# Patient Record
Sex: MALE | Race: WHITE | Employment: FULL TIME | URBAN - METROPOLITAN AREA
[De-identification: names, ages, dates, MRNs, and addresses within clinical notes are randomized per-mention and may not be internally consistent; named-entity substitution may affect disease eponyms.]

---

## 2019-03-07 ENCOUNTER — OFFICE VISIT (OUTPATIENT)
Dept: FAMILY MEDICINE CLINIC | Age: 54
End: 2019-03-07
Payer: COMMERCIAL

## 2019-03-07 VITALS
HEIGHT: 71 IN | OXYGEN SATURATION: 97 % | HEART RATE: 52 BPM | TEMPERATURE: 96.1 F | BODY MASS INDEX: 27.19 KG/M2 | DIASTOLIC BLOOD PRESSURE: 84 MMHG | SYSTOLIC BLOOD PRESSURE: 130 MMHG | WEIGHT: 194.2 LBS

## 2019-03-07 DIAGNOSIS — F43.22 ADJUSTMENT DISORDER WITH ANXIOUS MOOD: ICD-10-CM

## 2019-03-07 DIAGNOSIS — R07.89 OTHER CHEST PAIN: Primary | ICD-10-CM

## 2019-03-07 PROCEDURE — 99203 OFFICE O/P NEW LOW 30 MIN: CPT | Performed by: FAMILY MEDICINE

## 2019-03-07 PROCEDURE — 93000 ELECTROCARDIOGRAM COMPLETE: CPT | Performed by: FAMILY MEDICINE

## 2019-03-07 RX ORDER — LISINOPRIL 5 MG/1
5 TABLET ORAL DAILY
COMMUNITY
End: 2019-11-05 | Stop reason: SDUPTHER

## 2019-03-07 RX ORDER — ESCITALOPRAM OXALATE 10 MG/1
10 TABLET ORAL DAILY
Qty: 30 TABLET | Refills: 2 | Status: SHIPPED | OUTPATIENT
Start: 2019-03-07 | End: 2019-10-21

## 2019-03-07 SDOH — HEALTH STABILITY: MENTAL HEALTH: HOW OFTEN DO YOU HAVE A DRINK CONTAINING ALCOHOL?: 2-3 TIMES A WEEK

## 2019-03-07 SDOH — ECONOMIC STABILITY: FOOD INSECURITY: WITHIN THE PAST 12 MONTHS, THE FOOD YOU BOUGHT JUST DIDN'T LAST AND YOU DIDN'T HAVE MONEY TO GET MORE.: NEVER TRUE

## 2019-03-07 SDOH — HEALTH STABILITY: MENTAL HEALTH: HOW MANY STANDARD DRINKS CONTAINING ALCOHOL DO YOU HAVE ON A TYPICAL DAY?: 1 OR 2

## 2019-03-07 SDOH — ECONOMIC STABILITY: TRANSPORTATION INSECURITY
IN THE PAST 12 MONTHS, HAS LACK OF TRANSPORTATION KEPT YOU FROM MEETINGS, WORK, OR FROM GETTING THINGS NEEDED FOR DAILY LIVING?: NO

## 2019-03-07 SDOH — ECONOMIC STABILITY: TRANSPORTATION INSECURITY
IN THE PAST 12 MONTHS, HAS THE LACK OF TRANSPORTATION KEPT YOU FROM MEDICAL APPOINTMENTS OR FROM GETTING MEDICATIONS?: NO

## 2019-03-07 SDOH — ECONOMIC STABILITY: INCOME INSECURITY: HOW HARD IS IT FOR YOU TO PAY FOR THE VERY BASICS LIKE FOOD, HOUSING, MEDICAL CARE, AND HEATING?: NOT HARD AT ALL

## 2019-03-07 SDOH — ECONOMIC STABILITY: FOOD INSECURITY: WITHIN THE PAST 12 MONTHS, YOU WORRIED THAT YOUR FOOD WOULD RUN OUT BEFORE YOU GOT MONEY TO BUY MORE.: NEVER TRUE

## 2019-03-07 ASSESSMENT — PATIENT HEALTH QUESTIONNAIRE - PHQ9
SUM OF ALL RESPONSES TO PHQ QUESTIONS 1-9: 2
SUM OF ALL RESPONSES TO PHQ9 QUESTIONS 1 & 2: 2
1. LITTLE INTEREST OR PLEASURE IN DOING THINGS: 1
2. FEELING DOWN, DEPRESSED OR HOPELESS: 1
SUM OF ALL RESPONSES TO PHQ QUESTIONS 1-9: 2

## 2019-03-14 ENCOUNTER — OFFICE VISIT (OUTPATIENT)
Dept: FAMILY MEDICINE CLINIC | Age: 54
End: 2019-03-14
Payer: COMMERCIAL

## 2019-03-14 VITALS
SYSTOLIC BLOOD PRESSURE: 118 MMHG | HEART RATE: 66 BPM | OXYGEN SATURATION: 96 % | TEMPERATURE: 97 F | DIASTOLIC BLOOD PRESSURE: 86 MMHG | BODY MASS INDEX: 27.41 KG/M2 | WEIGHT: 193.8 LBS

## 2019-03-14 DIAGNOSIS — L08.9 INFECTED SEBACEOUS CYST: ICD-10-CM

## 2019-03-14 DIAGNOSIS — Z13.6 SCREENING FOR CARDIOVASCULAR CONDITION: ICD-10-CM

## 2019-03-14 DIAGNOSIS — S46.012A STRAIN OF LEFT ROTATOR CUFF CAPSULE, INITIAL ENCOUNTER: ICD-10-CM

## 2019-03-14 DIAGNOSIS — Z00.00 PHYSICAL EXAM, ANNUAL: Primary | ICD-10-CM

## 2019-03-14 DIAGNOSIS — Z13.1 SCREENING FOR DIABETES MELLITUS: ICD-10-CM

## 2019-03-14 DIAGNOSIS — L72.3 INFECTED SEBACEOUS CYST: ICD-10-CM

## 2019-03-14 PROCEDURE — 36415 COLL VENOUS BLD VENIPUNCTURE: CPT | Performed by: FAMILY MEDICINE

## 2019-03-14 PROCEDURE — 99396 PREV VISIT EST AGE 40-64: CPT | Performed by: FAMILY MEDICINE

## 2019-03-14 RX ORDER — SULFAMETHOXAZOLE AND TRIMETHOPRIM 800; 160 MG/1; MG/1
1 TABLET ORAL 2 TIMES DAILY
Qty: 20 TABLET | Refills: 0 | Status: SHIPPED | OUTPATIENT
Start: 2019-03-14 | End: 2019-10-21

## 2019-03-15 LAB
A/G RATIO: 1.5 (CALC) (ref 0.8–2.6)
ALBUMIN SERPL-MCNC: 4.7 GM/DL (ref 3.5–5.2)
ALP BLD-CCNC: 90 U/L (ref 23–144)
ALT SERPL-CCNC: 51 U/L (ref 0–60)
AST SERPL-CCNC: 35 U/L (ref 0–46)
BILIRUB SERPL-MCNC: 0.6 MG/DL (ref 0–1.2)
BUN / CREAT RATIO: 14 (CALC) (ref 7–25)
BUN BLDV-MCNC: 18 MG/DL (ref 3–29)
CALCIUM SERPL-MCNC: 9.6 MG/DL (ref 8.5–10.5)
CHLORIDE BLD-SCNC: 102 MEQ/L (ref 96–110)
CHOLESTEROL, TOTAL: 191 MG/DL
CO2: 21 MEQ/L (ref 19–32)
COPY(IES) SENT TO:: NORMAL
CREAT SERPL-MCNC: 1.3 MG/DL
GFR SERPL CREATININE-BSD FRML MDRD: 62 ML/MIN/1.73M2
GLOBULIN: 3.2 GM/DL (CALC) (ref 1.9–3.6)
GLUCOSE BLD-MCNC: 110 MG/DL
HDLC SERPL-MCNC: 40 MG/DL
LDL CHOLESTEROL: 127 MG/DL (CALC)
POTASSIUM SERPL-SCNC: 4.5 MEQ/L (ref 3.4–5.3)
SODIUM BLD-SCNC: 139 MEQ/L (ref 135–148)
TOTAL PROTEIN: 7.9 GM/DL (ref 6–8.3)
TRIGL SERPL-MCNC: 118 MG/DL
VLDLC SERPL CALC-MCNC: 24 MG/DL (CALC) (ref 4–38)

## 2019-03-19 ENCOUNTER — OFFICE VISIT (OUTPATIENT)
Dept: FAMILY MEDICINE CLINIC | Age: 54
End: 2019-03-19
Payer: COMMERCIAL

## 2019-03-19 VITALS
TEMPERATURE: 96.5 F | HEART RATE: 99 BPM | WEIGHT: 198.4 LBS | OXYGEN SATURATION: 98 % | SYSTOLIC BLOOD PRESSURE: 124 MMHG | BODY MASS INDEX: 28.07 KG/M2 | DIASTOLIC BLOOD PRESSURE: 84 MMHG

## 2019-03-19 DIAGNOSIS — R53.82 CHRONIC FATIGUE: Primary | ICD-10-CM

## 2019-03-19 DIAGNOSIS — L72.0 EIC (EPIDERMAL INCLUSION CYST): ICD-10-CM

## 2019-03-19 PROBLEM — I10 ESSENTIAL HYPERTENSION: Status: ACTIVE | Noted: 2018-03-12

## 2019-03-19 PROBLEM — G47.33 OSA (OBSTRUCTIVE SLEEP APNEA): Status: ACTIVE | Noted: 2018-05-02

## 2019-03-19 PROBLEM — E66.3 OVERWEIGHT (BMI 25.0-29.9): Status: ACTIVE | Noted: 2018-05-02

## 2019-03-19 PROBLEM — I45.10 INCOMPLETE RIGHT BUNDLE BRANCH BLOCK: Status: ACTIVE | Noted: 2018-03-16

## 2019-03-19 PROBLEM — G47.30 SLEEP DISORDER BREATHING: Status: ACTIVE | Noted: 2018-03-16

## 2019-03-19 PROCEDURE — 99213 OFFICE O/P EST LOW 20 MIN: CPT | Performed by: FAMILY MEDICINE

## 2019-03-25 ENCOUNTER — TELEPHONE (OUTPATIENT)
Dept: FAMILY MEDICINE CLINIC | Age: 54
End: 2019-03-25

## 2019-03-26 ENCOUNTER — OFFICE VISIT (OUTPATIENT)
Dept: FAMILY MEDICINE CLINIC | Age: 54
End: 2019-03-26
Payer: COMMERCIAL

## 2019-03-26 VITALS
WEIGHT: 199.2 LBS | SYSTOLIC BLOOD PRESSURE: 124 MMHG | OXYGEN SATURATION: 98 % | DIASTOLIC BLOOD PRESSURE: 68 MMHG | BODY MASS INDEX: 28.18 KG/M2 | TEMPERATURE: 95.7 F | HEART RATE: 106 BPM

## 2019-03-26 DIAGNOSIS — L72.0 EIC (EPIDERMAL INCLUSION CYST): Primary | ICD-10-CM

## 2019-03-26 LAB
COPY(IES) SENT TO:: NORMAL
SEX HORMONE BINDING GLOBULIN: NORMAL
TESTOSTERONE FREE: 52.7
TESTOSTERONE FREE: 52.7 PG/ML (ref 35–155)
TESTOSTERONE TOTAL-MALE: 507 NG/DL (ref 250–1100)
TESTOSTERONE TOTAL: 507
TESTOSTERONE, BIOAVAILABLE: NORMAL

## 2019-03-26 PROCEDURE — 11402 EXC TR-EXT B9+MARG 1.1-2 CM: CPT | Performed by: FAMILY MEDICINE

## 2019-10-10 ENCOUNTER — TELEPHONE (OUTPATIENT)
Dept: FAMILY MEDICINE CLINIC | Age: 54
End: 2019-10-10

## 2019-10-21 ENCOUNTER — OFFICE VISIT (OUTPATIENT)
Dept: FAMILY MEDICINE CLINIC | Age: 54
End: 2019-10-21
Payer: COMMERCIAL

## 2019-10-21 VITALS
TEMPERATURE: 96.7 F | WEIGHT: 200.8 LBS | OXYGEN SATURATION: 98 % | BODY MASS INDEX: 28.4 KG/M2 | SYSTOLIC BLOOD PRESSURE: 152 MMHG | DIASTOLIC BLOOD PRESSURE: 92 MMHG | HEART RATE: 90 BPM

## 2019-10-21 DIAGNOSIS — N52.9 ERECTILE DYSFUNCTION, UNSPECIFIED ERECTILE DYSFUNCTION TYPE: Primary | ICD-10-CM

## 2019-10-21 DIAGNOSIS — I10 ESSENTIAL HYPERTENSION: ICD-10-CM

## 2019-10-21 PROCEDURE — 99214 OFFICE O/P EST MOD 30 MIN: CPT | Performed by: FAMILY MEDICINE

## 2019-10-21 RX ORDER — TADALAFIL 20 MG/1
20 TABLET ORAL DAILY PRN
Qty: 30 TABLET | Refills: 1 | Status: SHIPPED | OUTPATIENT
Start: 2019-10-21 | End: 2021-01-27 | Stop reason: SDUPTHER

## 2019-10-21 ASSESSMENT — PATIENT HEALTH QUESTIONNAIRE - PHQ9
SUM OF ALL RESPONSES TO PHQ QUESTIONS 1-9: 0
SUM OF ALL RESPONSES TO PHQ QUESTIONS 1-9: 0
1. LITTLE INTEREST OR PLEASURE IN DOING THINGS: 0
2. FEELING DOWN, DEPRESSED OR HOPELESS: 0
SUM OF ALL RESPONSES TO PHQ9 QUESTIONS 1 & 2: 0

## 2019-11-04 ENCOUNTER — TELEPHONE (OUTPATIENT)
Dept: FAMILY MEDICINE CLINIC | Age: 54
End: 2019-11-04

## 2019-11-04 ENCOUNTER — NURSE ONLY (OUTPATIENT)
Dept: FAMILY MEDICINE CLINIC | Age: 54
End: 2019-11-04

## 2019-11-04 VITALS — DIASTOLIC BLOOD PRESSURE: 84 MMHG | SYSTOLIC BLOOD PRESSURE: 124 MMHG

## 2019-11-04 DIAGNOSIS — I10 ESSENTIAL HYPERTENSION: Primary | ICD-10-CM

## 2019-11-05 RX ORDER — LISINOPRIL 5 MG/1
5 TABLET ORAL DAILY
Qty: 90 TABLET | Refills: 1 | Status: SHIPPED | OUTPATIENT
Start: 2019-11-05 | End: 2020-07-08 | Stop reason: SDUPTHER

## 2019-12-19 ENCOUNTER — TELEPHONE (OUTPATIENT)
Dept: FAMILY MEDICINE CLINIC | Age: 54
End: 2019-12-19

## 2019-12-19 NOTE — TELEPHONE ENCOUNTER
There is nothing in the record that makes me think that he needs his CT scan. If he does not improve by the time he finishes antibiotics, I would like to see him in the office.

## 2019-12-19 NOTE — TELEPHONE ENCOUNTER
Pt called state he went to Urgent care yesterday and the physician was supposed to send over the office notes. There was some concerns the physician was worried about. Pt wanted to know if he needs to have a CT scan done.   Called urgent care they are faxing over records

## 2019-12-20 NOTE — TELEPHONE ENCOUNTER
Patient called requesting to speak to the provider in regards to his symptoms and the diagnosis from the Urgent care. Patient was informed that Dr. Araceli Beth has reviewed the information from the Urgent Care.

## 2019-12-27 ENCOUNTER — OFFICE VISIT (OUTPATIENT)
Dept: FAMILY MEDICINE CLINIC | Age: 54
End: 2019-12-27
Payer: COMMERCIAL

## 2019-12-27 VITALS
TEMPERATURE: 96.7 F | BODY MASS INDEX: 27.78 KG/M2 | OXYGEN SATURATION: 98 % | SYSTOLIC BLOOD PRESSURE: 120 MMHG | HEART RATE: 84 BPM | WEIGHT: 196.4 LBS | DIASTOLIC BLOOD PRESSURE: 70 MMHG

## 2019-12-27 DIAGNOSIS — R06.09 DYSPNEA ON EXERTION: Primary | ICD-10-CM

## 2019-12-27 DIAGNOSIS — R41.3 MEMORY LOSS: ICD-10-CM

## 2019-12-27 PROCEDURE — 99214 OFFICE O/P EST MOD 30 MIN: CPT | Performed by: FAMILY MEDICINE

## 2019-12-27 ASSESSMENT — PATIENT HEALTH QUESTIONNAIRE - PHQ9
SUM OF ALL RESPONSES TO PHQ9 QUESTIONS 1 & 2: 0
1. LITTLE INTEREST OR PLEASURE IN DOING THINGS: 0
SUM OF ALL RESPONSES TO PHQ QUESTIONS 1-9: 0
2. FEELING DOWN, DEPRESSED OR HOPELESS: 0
SUM OF ALL RESPONSES TO PHQ QUESTIONS 1-9: 0

## 2020-01-09 ENCOUNTER — OFFICE VISIT (OUTPATIENT)
Dept: FAMILY MEDICINE CLINIC | Age: 55
End: 2020-01-09
Payer: COMMERCIAL

## 2020-01-09 ENCOUNTER — TELEPHONE (OUTPATIENT)
Dept: FAMILY MEDICINE CLINIC | Age: 55
End: 2020-01-09

## 2020-01-09 VITALS
DIASTOLIC BLOOD PRESSURE: 82 MMHG | OXYGEN SATURATION: 97 % | BODY MASS INDEX: 28.72 KG/M2 | TEMPERATURE: 97.5 F | SYSTOLIC BLOOD PRESSURE: 130 MMHG | HEART RATE: 89 BPM | WEIGHT: 203 LBS

## 2020-01-09 PROCEDURE — 99213 OFFICE O/P EST LOW 20 MIN: CPT | Performed by: FAMILY MEDICINE

## 2020-01-09 RX ORDER — ALBUTEROL SULFATE 90 UG/1
2 AEROSOL, METERED RESPIRATORY (INHALATION) 4 TIMES DAILY PRN
Qty: 1 INHALER | Refills: 0 | Status: SHIPPED | OUTPATIENT
Start: 2020-01-09 | End: 2020-01-30

## 2020-01-09 NOTE — TELEPHONE ENCOUNTER
He should have the breathing test done. That consult was ordered 2 days ago. If he still feeling bad I can see him again to see if there is other options for treatment.

## 2020-01-09 NOTE — PROGRESS NOTES
Patient here complaining of continued mild dyspnea on exertion. Patient also with a burning in his chest whenever he lays down and sometimes when he is not laying down. He denies any nausea or vomiting when he has the chest pain. He states the burning resolves almost as soon as he sits up. Patient denies any fevers or chills. Patient states he had a sore throat a day or 2 ago but it has resolved. Patient is scheduled to have his pulmonary function test done mid February. Denies any recent fevers or chills. Patient had a normal chest x-ray within the last 2 weeks. ROS: No TIA's or unusual headaches, no dysphagia. No prolonged cough. No abdominal pain, change in bowel habits, black or bloody stools. No urinary tract or BPH symptoms. No new or unusual musculoskeletal symptoms. O:   Vitals:    01/09/20 1446   BP: 130/82   Pulse: 89   Temp: 97.5 °F (36.4 °C)   SpO2: 97%     No acute distress. Alert and Oriented x 3  HEENT: Atraumatic. Normocephalic. PERRLA, EOMI, Conjunctiva clear  Oropharynx clear, mucosa moist.  Nasal mucosa normal  NECK: without thyromegaly, lymphadenopathy, JVD  LUNGS:Clear to ascultation bilaterally. Breathing comfortably  CARDIOVASCULAR:  Regular rate and rhythm, no murmurs, rubs, or gallops  EXTREMITY: Full range of motion. No clubbing/cyanosis/edema  SKIN: Warm, Dry, No rash. PSYCH: Mood and Affect normal.    A:    Diagnosis Orders   1. Dyspnea on exertion  albuterol sulfate  (90 Base) MCG/ACT inhaler     P: Trial of albuterol. Patient declined trial of PPI or H2 blocker. Patient to follow-up with pulmonary function test.  Follow-up as needed.

## 2020-01-09 NOTE — PATIENT INSTRUCTIONS
Patient Education        Shortness of Breath: Care Instructions  Your Care Instructions  Shortness of breath has many causes. Sometimes conditions such as anxiety can lead to shortness of breath. Some people get mild shortness of breath when they exercise. Trouble breathing also can be a symptom of a serious problem, such as asthma, lung disease, emphysema, heart problems, and pneumonia. If your shortness of breath continues, you may need tests and treatment. Watch for any changes in your breathing and other symptoms. Follow-up care is a key part of your treatment and safety. Be sure to make and go to all appointments, and call your doctor if you are having problems. It's also a good idea to know your test results and keep a list of the medicines you take. How can you care for yourself at home? · Do not smoke or allow others to smoke around you. If you need help quitting, talk to your doctor about stop-smoking programs and medicines. These can increase your chances of quitting for good. · Get plenty of rest and sleep. · Take your medicines exactly as prescribed. Call your doctor if you think you are having a problem with your medicine. · Find healthy ways to deal with stress. ? Exercise daily. ? Get plenty of sleep. ? Eat regularly and well. When should you call for help? Call 911 anytime you think you may need emergency care. For example, call if:    · You have severe shortness of breath.     · You have symptoms of a heart attack. These may include:  ? Chest pain or pressure, or a strange feeling in the chest.  ? Sweating. ? Shortness of breath. ? Nausea or vomiting. ? Pain, pressure, or a strange feeling in the back, neck, jaw, or upper belly or in one or both shoulders or arms. ? Lightheadedness or sudden weakness. ? A fast or irregular heartbeat. After you call  911, the  may tell you to chew 1 adult-strength or 2 to 4 low-dose aspirin. Wait for an ambulance.  Do not try to drive

## 2020-01-09 NOTE — TELEPHONE ENCOUNTER
Pt notified there is also blood in his sinus drainage. Asked if he should really get the test done if he has an infection. State they called him yesterday to schedule but he was in a meeting and they never called him back.  Offered appt with you today pt today state he has to finish some other things first

## 2020-01-22 ENCOUNTER — TELEPHONE (OUTPATIENT)
Dept: FAMILY MEDICINE CLINIC | Age: 55
End: 2020-01-22

## 2020-01-30 RX ORDER — ALBUTEROL SULFATE 90 UG/1
2 AEROSOL, METERED RESPIRATORY (INHALATION) 4 TIMES DAILY PRN
Qty: 6.7 INHALER | Refills: 0 | Status: SHIPPED | OUTPATIENT
Start: 2020-01-30 | End: 2020-07-08

## 2020-02-10 ENCOUNTER — HOSPITAL ENCOUNTER (OUTPATIENT)
Dept: PULMONOLOGY | Age: 55
Discharge: HOME | End: 2020-02-10
Payer: COMMERCIAL

## 2020-02-10 LAB
DLCO %PRED: NORMAL %
DLCO PRED: NORMAL
DLCO/VA %PRED: NORMAL
DLCO/VA PRED: NORMAL
DLCO/VA: NORMAL
DLCO: NORMAL
EXPIRATORY TIME-POST: NORMAL
EXPIRATORY TIME: NORMAL
FEF 25-75% %CHNG: NORMAL
FEF 25-75% %PRED-POST: NORMAL
FEF 25-75% %PRED-PRE: NORMAL
FEF 25-75% PRED: NORMAL
FEF 25-75%-POST: NORMAL
FEF 25-75%-PRE: NORMAL
FEV1 %PRED-POST: 104 %
FEV1 %PRED-PRE: 105 %
FEV1 PRED: NORMAL
FEV1-POST: NORMAL
FEV1-PRE: NORMAL
FEV1/FVC %PRED-POST: NORMAL
FEV1/FVC %PRED-PRE: NORMAL
FEV1/FVC PRED: NORMAL
FEV1/FVC-POST: 81 %
FEV1/FVC-PRE: 81 %
FVC %PRED-POST: NORMAL
FVC %PRED-PRE: NORMAL
FVC PRED: NORMAL
FVC-POST: NORMAL
FVC-PRE: NORMAL
GAW %PRED: NORMAL
GAW PRED: NORMAL
GAW: NORMAL
IC %PRED: NORMAL
IC PRED: NORMAL
IC: NORMAL
MEP: NORMAL
MIP: NORMAL
MVV %PRED-PRE: NORMAL
MVV PRED: NORMAL
MVV-PRE: NORMAL
PEF %PRED-POST: NORMAL
PEF %PRED-PRE: NORMAL
PEF PRED: NORMAL
PEF%CHNG: NORMAL
PEF-POST: NORMAL
PEF-PRE: NORMAL
RAW %PRED: NORMAL
RAW PRED: NORMAL
RAW: NORMAL
RV %PRED: NORMAL
RV PRED: NORMAL
RV: NORMAL
SVC %PRED: NORMAL
SVC PRED: NORMAL
SVC: NORMAL
TLC %PRED: NORMAL %
TLC PRED: NORMAL
TLC: NORMAL
VA %PRED: NORMAL
VA PRED: NORMAL
VA: NORMAL
VTG %PRED: NORMAL
VTG PRED: NORMAL
VTG: NORMAL

## 2020-02-10 PROCEDURE — 94060 EVALUATION OF WHEEZING: CPT

## 2020-02-10 ASSESSMENT — PULMONARY FUNCTION TESTS
FEV1/FVC_POST: 81
FEV1_PERCENT_PREDICTED_PRE: 105
FEV1/FVC_PRE: 81
FEV1_PERCENT_PREDICTED_POST: 104

## 2020-07-08 ENCOUNTER — VIRTUAL VISIT (OUTPATIENT)
Dept: FAMILY MEDICINE CLINIC | Age: 55
End: 2020-07-08
Payer: COMMERCIAL

## 2020-07-08 PROCEDURE — 99213 OFFICE O/P EST LOW 20 MIN: CPT | Performed by: FAMILY MEDICINE

## 2020-07-08 RX ORDER — LISINOPRIL 5 MG/1
5 TABLET ORAL DAILY
Qty: 90 TABLET | Refills: 1 | Status: SHIPPED | OUTPATIENT
Start: 2020-07-08 | End: 2021-01-27 | Stop reason: SDUPTHER

## 2020-07-08 ASSESSMENT — ENCOUNTER SYMPTOMS
GASTROINTESTINAL NEGATIVE: 1
EYES NEGATIVE: 1
ALLERGIC/IMMUNOLOGIC NEGATIVE: 1
RESPIRATORY NEGATIVE: 1

## 2020-07-08 ASSESSMENT — PATIENT HEALTH QUESTIONNAIRE - PHQ9
SUM OF ALL RESPONSES TO PHQ QUESTIONS 1-9: 0
SUM OF ALL RESPONSES TO PHQ9 QUESTIONS 1 & 2: 0
1. LITTLE INTEREST OR PLEASURE IN DOING THINGS: 0
SUM OF ALL RESPONSES TO PHQ QUESTIONS 1-9: 0
2. FEELING DOWN, DEPRESSED OR HOPELESS: 0

## 2020-07-08 NOTE — PROGRESS NOTES
2020    TELEHEALTH EVALUATION -- Audio/Visual (During BDHBI-61 public health emergency)    HPI:    Melly Lux (:  1965) has requested an audio/video evaluation for the following concern(s):    Hypertension: Patient here for follow-up of elevated blood pressure. He is exercising and is adherent to low salt diet. Blood pressure is not monitored at home. Cardiac symptoms none. Patient denies chest pain, dyspnea, orthopnea and palpitations. Cardiovascular risk factors: hypertension and male gender. Use of agents associated with hypertension: none. History of target organ damage: none. Review of Systems   Constitutional: Negative. HENT: Negative. Eyes: Negative. Respiratory: Negative. Cardiovascular: Negative. Gastrointestinal: Negative. Endocrine: Negative. Genitourinary: Negative. Musculoskeletal: Negative. Skin: Negative. Allergic/Immunologic: Negative. Neurological: Negative. Hematological: Negative. Psychiatric/Behavioral: Negative. Prior to Visit Medications    Medication Sig Taking?  Authorizing Provider   albuterol sulfate  (90 Base) MCG/ACT inhaler INHALE 2 PUFFS INTO THE LUNGS 4 TIMES DAILY AS NEEDED FOR WHEEZING  Tabitha Wilson MD   lisinopril (PRINIVIL;ZESTRIL) 5 MG tablet Take 1 tablet by mouth daily  Tabitha Wilson MD   tadalafil (CIALIS) 20 MG tablet Take 1 tablet by mouth daily as needed for Erectile Dysfunction  Tabitha Wilson MD       Social History     Tobacco Use    Smoking status: Never Smoker    Smokeless tobacco: Never Used   Substance Use Topics    Alcohol use: Yes     Frequency: 2-3 times a week     Drinks per session: 1 or 2     Binge frequency: Never    Drug use: Never        No Known Allergies,   Past Medical History:   Diagnosis Date    HTN (hypertension)    , No past surgical history on file.,   Social History     Tobacco Use    Smoking status: Never Smoker    Smokeless tobacco: Never Used   Substance Use health emergency), evaluation of the following organ systems was limited: Vitals/Constitutional/EENT/Resp/CV/GI//MS/Neuro/Skin/Heme-Lymph-Imm. Pursuant to the emergency declaration under the [address] [place] [address], [address] [place] [address] authority and the [employer] Act, this Virtual Visit was conducted with patient's (and/or legal guardian's) consent, to reduce the patient's risk of exposure to COVID-19 and provide necessary medical care. The patient (and/or legal guardian) has also been advised to contact this office for worsening conditions or problems, and seek emergency medical treatment and/or call 911 if deemed necessary. Patient identification was verified at the start of the visit: Yes    Total time spent on this encounter: Not billed by time    Services were provided through a video synchronous discussion virtually to substitute for in-person clinic visit. Patient and provider were located at their individual homes. --German Leung MD on 7/8/2020 at 8:19 AM    An electronic signature was used to authenticate this note.

## 2020-07-08 NOTE — PATIENT INSTRUCTIONS
Patient Education        DASH Diet: Care Instructions  Your Care Instructions     The DASH diet is an eating plan that can help lower your blood pressure. DASH stands for Dietary Approaches to Stop Hypertension. Hypertension is high blood pressure. The DASH diet focuses on eating foods that are high in calcium, potassium, and magnesium. These nutrients can lower blood pressure. The foods that are highest in these nutrients are fruits, vegetables, low-fat dairy products, nuts, seeds, and legumes. But taking calcium, potassium, and magnesium supplements instead of eating foods that are high in those nutrients does not have the same effect. The DASH diet also includes whole grains, fish, and poultry. The DASH diet is one of several lifestyle changes your doctor may recommend to lower your high blood pressure. Your doctor may also want you to decrease the amount of sodium in your diet. Lowering sodium while following the DASH diet can lower blood pressure even further than just the DASH diet alone. Follow-up care is a key part of your treatment and safety. Be sure to make and go to all appointments, and call your doctor if you are having problems. It's also a good idea to know your test results and keep a list of the medicines you take. How can you care for yourself at home? Following the DASH diet  · Eat 4 to 5 servings of fruit each day. A serving is 1 medium-sized piece of fruit, ½ cup chopped or canned fruit, 1/4 cup dried fruit, or 4 ounces (½ cup) of fruit juice. Choose fruit more often than fruit juice. · Eat 4 to 5 servings of vegetables each day. A serving is 1 cup of lettuce or raw leafy vegetables, ½ cup of chopped or cooked vegetables, or 4 ounces (½ cup) of vegetable juice. Choose vegetables more often than vegetable juice. · Get 2 to 3 servings of low-fat and fat-free dairy each day. A serving is 8 ounces of milk, 1 cup of yogurt, or 1 ½ ounces of cheese. · Eat 6 to 8 servings of grains each day. A serving is 1 slice of bread, 1 ounce of dry cereal, or ½ cup of cooked rice, pasta, or cooked cereal. Try to choose whole-grain products as much as possible. · Limit lean meat, poultry, and fish to 2 servings each day. A serving is 3 ounces, about the size of a deck of cards. · Eat 4 to 5 servings of nuts, seeds, and legumes (cooked dried beans, lentils, and split peas) each week. A serving is 1/3 cup of nuts, 2 tablespoons of seeds, or ½ cup of cooked beans or peas. · Limit fats and oils to 2 to 3 servings each day. A serving is 1 teaspoon of vegetable oil or 2 tablespoons of salad dressing. · Limit sweets and added sugars to 5 servings or less a week. A serving is 1 tablespoon jelly or jam, ½ cup sorbet, or 1 cup of lemonade. · Eat less than 2,300 milligrams (mg) of sodium a day. If you limit your sodium to 1,500 mg a day, you can lower your blood pressure even more. Tips for success  · Start small. Do not try to make dramatic changes to your diet all at once. You might feel that you are missing out on your favorite foods and then be more likely to not follow the plan. Make small changes, and stick with them. Once those changes become habit, add a few more changes. · Try some of the following:  ? Make it a goal to eat a fruit or vegetable at every meal and at snacks. This will make it easy to get the recommended amount of fruits and vegetables each day. ? Try yogurt topped with fruit and nuts for a snack or healthy dessert. ? Add lettuce, tomato, cucumber, and onion to sandwiches. ? Combine a ready-made pizza crust with low-fat mozzarella cheese and lots of vegetable toppings. Try using tomatoes, squash, spinach, broccoli, carrots, cauliflower, and onions. ? Have a variety of cut-up vegetables with a low-fat dip as an appetizer instead of chips and dip. ? Sprinkle sunflower seeds or chopped almonds over salads. Or try adding chopped walnuts or almonds to cooked vegetables.   ? Try some vegetarian

## 2020-08-24 ENCOUNTER — VIRTUAL VISIT (OUTPATIENT)
Dept: FAMILY MEDICINE CLINIC | Age: 55
End: 2020-08-24
Payer: COMMERCIAL

## 2020-08-24 PROCEDURE — 99213 OFFICE O/P EST LOW 20 MIN: CPT | Performed by: FAMILY MEDICINE

## 2020-08-24 RX ORDER — PREDNISONE 1 MG/1
TABLET ORAL
Qty: 9 TABLET | Refills: 0 | Status: SHIPPED | OUTPATIENT
Start: 2020-08-24 | End: 2020-09-03

## 2020-08-24 RX ORDER — PREDNISONE 20 MG/1
TABLET ORAL
Qty: 13 TABLET | Refills: 0 | Status: SHIPPED | OUTPATIENT
Start: 2020-08-24 | End: 2021-01-27

## 2020-08-24 ASSESSMENT — ENCOUNTER SYMPTOMS
GASTROINTESTINAL NEGATIVE: 1
RESPIRATORY NEGATIVE: 1
ALLERGIC/IMMUNOLOGIC NEGATIVE: 1
EYES NEGATIVE: 1

## 2020-08-24 NOTE — PATIENT INSTRUCTIONS
Patient Education        Poison [place], [place], and [place]: Care Instructions  Your Care Instructions     Poison ivy, poison oak, and poison sumac are plants that can cause a skin rash upon contact. The red, itchy rash often shows up in lines or streaks and may cause fluid-filled blisters or large, raised hives. The rash is caused by an allergic reaction to an oil in poison ivy, oak, and sumac. The rash may occur when you touch the plant or when you touch clothing, pet fur, sporting gear, gardening tools, or other objects that have come in contact with one of these plants. You cannot catch or spread the rash, even if you touch it or the blister fluid, because the plant oil will already have been absorbed or washed off the skin. The rash may seem to be spreading, but either it is still developing from earlier contact or you have touched something that still has the plant oil on it. Follow-up care is a key part of your treatment and safety. Be sure to make and go to all appointments, and call your doctor if you are having problems. It's also a good idea to know your test results and keep a list of the medicines you take. How can you care for yourself at home? · If your doctor prescribed a cream, use it as directed. If your doctor prescribed medicine, take it exactly as prescribed. Call your doctor if you think you are having a problem with your medicine. · Use cold, wet cloths to reduce itching. · Keep cool, and stay out of the sun. · Leave the rash open to the air. · Wash all clothing or other things that may have come in contact with the plant oil. · Avoid most lotions and ointments until the rash heals. Calamine lotion may help relieve symptoms of a plant rash. Use it 3 or 4 times a day. To prevent poison ivy exposure  If you know that you will be near poison ivy, oak, or sumac, you can try these options:  · Use a product designed to help prevent plant oil from getting on the skin.  These products, such as REYNA[place] X Pre-Contact Skin Solution, come in lotions, sprays, or towelettes. You put the product on your skin right before you go outdoors. · If you did not use a preventive product and you have had contact with plant oil, clean it off your skin as soon as possible. Use a product such as Tecnu Original Outdoor Skin Cleanser. These products can also be used to clean plant oil from clothing or tools. When should you call for help? Call your doctor now or seek immediate medical care if:  · Your rash gets worse, and you start to feel bad and have a fever, a stiff neck, nausea, and vomiting. · You have signs of infection, such as:  ? Increased pain, swelling, warmth, or redness. ? Red streaks leading from the rash. ? Pus draining from the rash. ? A fever. Watch closely for changes in your health, and be sure to contact your doctor if:  · You have new blisters or bruises, or the rash spreads and looks like a sunburn. · The rash gets worse, or it comes back after nearly disappearing. · You think a medicine you are using is making your rash worse. · Your rash does not clear up after 1 to 2 weeks of home treatment. · You have joint aches or body aches with your rash. Where can you learn more? Go to https://Thinktwice.[employer]ex[employer]. org and sign in to your [employer] account. Enter C425 in the [facility] box to learn more about \"Poison [place], [place], and [place]: Care Instructions. \"     If you do not have an account, please click on the \"Sign Up Now\" link. Current as of: October 31, 2019               Content Version: 12.5  © 7417-8685 Healthwise, Incorporated. Care instructions adapted under license by [facility] ([facility]). If you have questions about a medical condition or this instruction, always ask your healthcare professional. [address] any warranty or liability for your use of this information.

## 2020-08-24 NOTE — PROGRESS NOTES
 Alcohol use: Yes     Frequency: 2-3 times a week     Drinks per session: 1 or 2     Binge frequency: Never    Drug use: Never       PHYSICAL EXAMINATION:  [ INSTRUCTIONS:  \"[x]\" Indicates a positive item  \"[]\" Indicates a negative item  -- DELETE ALL ITEMS NOT EXAMINED]  Vital Signs: (As obtained by patient/caregiver or practitioner observation)    Not available    Constitutional: [x] Appears well-developed and well-nourished [x] No apparent distress      [] Abnormal-   Mental status  [x] Alert and awake  [x] Oriented to person/place/time [x]Able to follow commands      Eyes:  EOM    [x]  Normal  [] Abnormal-  Sclera  [x]  Normal  [] Abnormal -         Discharge [x]  None visible  [] Abnormal -    HENT:   [x] Normocephalic, atraumatic. [] Abnormal   [x] Mouth/Throat: Mucous membranes are moist.     External Ears [x] Normal  [] Abnormal-     Neck: [x] No visualized mass     Pulmonary/Chest: [x] Respiratory effort normal.  [x] No visualized signs of difficulty breathing or respiratory distress        [] Abnormal-      Musculoskeletal:           [x] Normal range of motion of neck        [] Abnormal-       Neurological:        [x] No Facial Asymmetry (Cranial nerve 7 motor function) (limited exam to video visit)          [x] No gaze palsy        [] Abnormal-         Skin:        [x] No significant exanthematous lesions or discoloration noted on facial skin         [x] Abnormal-erythematous raised rash on hand           Psychiatric:       [x] Normal Affect [x] No Hallucinations        [] Abnormal-     Other pertinent observable physical exam findings-     ASSESSMENT/PLAN:  1. Rhus dermatitis    - predniSONE (DELTASONE) 20 MG tablet; Take 2 tabs daily for 5 days, then take 1 tab daily for 3 days, then start 5 mg tablets  Dispense: 13 tablet; Refill: 0  - predniSONE (DELTASONE) 5 MG tablet; After completing 20 mg tablets, take 2 tabs daily for 3 days then 1 tab daily for 3 days  Dispense: 9 tablet;  Refill: 0      Return if symptoms worsen or fail to improve. Kera Tidwell is a 47 y.o. male being evaluated by a Virtual Visit (video visit) encounter to address concerns as mentioned above. A caregiver was present when appropriate. Due to this being a TeleHealth encounter (During IPP-67 public health emergency), evaluation of the following organ systems was limited: Vitals/Constitutional/EENT/Resp/CV/GI//MS/Neuro/Skin/Heme-Lymph-Imm. Pursuant to the emergency declaration under the [address] [place] [address], [address] [place] [address] authority and the [employer] Act, this Virtual Visit was conducted with patient's (and/or legal guardian's) consent, to reduce the patient's risk of exposure to COVID-19 and provide necessary medical care. The patient (and/or legal guardian) has also been advised to contact this office for worsening conditions or problems, and seek emergency medical treatment and/or call 911 if deemed necessary. Patient identification was verified at the start of the visit: Yes    Total time spent on this encounter: Not billed by time    Services were provided through a video synchronous discussion virtually to substitute for in-person clinic visit. Patient and provider were located at their individual homes. --Ángela Pickett MD on 8/24/2020 at 2:23 PM    An electronic signature was used to authenticate this note.

## 2020-11-09 ENCOUNTER — TELEPHONE (OUTPATIENT)
Dept: FAMILY MEDICINE CLINIC | Age: 55
End: 2020-11-09

## 2020-11-09 NOTE — TELEPHONE ENCOUNTER
Patient left message stating he tested positive on 10/18/202 and then on 11/04/2020. Wants to know what should he do. Patient state has no symptoms and was surprised he tested positive again. Should he be working just needs to know what to do.

## 2020-11-09 NOTE — TELEPHONE ENCOUNTER
If he has no symptoms, as long as he quarantined for 10 days after his first test, he can return to work.

## 2021-01-27 ENCOUNTER — OFFICE VISIT (OUTPATIENT)
Dept: FAMILY MEDICINE CLINIC | Age: 56
End: 2021-01-27
Payer: COMMERCIAL

## 2021-01-27 VITALS
TEMPERATURE: 96.6 F | OXYGEN SATURATION: 98 % | WEIGHT: 195.4 LBS | DIASTOLIC BLOOD PRESSURE: 72 MMHG | HEART RATE: 63 BPM | SYSTOLIC BLOOD PRESSURE: 122 MMHG | BODY MASS INDEX: 27.64 KG/M2

## 2021-01-27 DIAGNOSIS — I10 ESSENTIAL HYPERTENSION: Primary | ICD-10-CM

## 2021-01-27 DIAGNOSIS — Z13.6 SCREENING FOR CARDIOVASCULAR CONDITION: ICD-10-CM

## 2021-01-27 DIAGNOSIS — R42 VERTIGO: ICD-10-CM

## 2021-01-27 DIAGNOSIS — Z12.11 COLON CANCER SCREENING: ICD-10-CM

## 2021-01-27 DIAGNOSIS — N52.9 ERECTILE DYSFUNCTION, UNSPECIFIED ERECTILE DYSFUNCTION TYPE: ICD-10-CM

## 2021-01-27 PROCEDURE — 99214 OFFICE O/P EST MOD 30 MIN: CPT | Performed by: FAMILY MEDICINE

## 2021-01-27 RX ORDER — TADALAFIL 20 MG/1
20 TABLET ORAL DAILY PRN
Qty: 30 TABLET | Refills: 1 | Status: SHIPPED | OUTPATIENT
Start: 2021-01-27 | End: 2021-07-21 | Stop reason: SDUPTHER

## 2021-01-27 RX ORDER — LISINOPRIL 5 MG/1
5 TABLET ORAL DAILY
Qty: 90 TABLET | Refills: 1 | Status: SHIPPED | OUTPATIENT
Start: 2021-01-27 | End: 2021-06-29 | Stop reason: SDUPTHER

## 2021-01-27 ASSESSMENT — PATIENT HEALTH QUESTIONNAIRE - PHQ9
1. LITTLE INTEREST OR PLEASURE IN DOING THINGS: 0
SUM OF ALL RESPONSES TO PHQ QUESTIONS 1-9: 0
SUM OF ALL RESPONSES TO PHQ QUESTIONS 1-9: 0

## 2021-01-27 NOTE — PATIENT INSTRUCTIONS
Call 911 anytime you think you may need emergency care. For example, call if:    · You passed out (lost consciousness).     · You have dizziness along with symptoms of a heart attack. These may include:  ? Chest pain or pressure, or a strange feeling in the chest.  ? Sweating. ? Shortness of breath. ? Nausea or vomiting. ? Pain, pressure, or a strange feeling in the back, neck, jaw, or upper belly or in one or both shoulders or arms. ? Lightheadedness or sudden weakness. ? A fast or irregular heartbeat.     · You have symptoms of a stroke. These may include:  ? Sudden numbness, tingling, weakness, or loss of movement in your face, arm, or leg, especially on only one side of your body. ? Sudden vision changes. ? Sudden trouble speaking. ? Sudden confusion or trouble understanding simple statements. ? Sudden problems with walking or balance. ? A sudden, severe headache that is different from past headaches. Call your doctor now or seek immediate medical care if:    · You feel dizzy and have a fever, headache, or ringing in your ears.     · You have new or increased nausea and vomiting.     · Your dizziness does not go away or comes back. Watch closely for changes in your health, and be sure to contact your doctor if:    · You do not get better as expected. Where can you learn more? Go to https://[employer]. org and sign in to your [employer] account. Enter I450 in the [employer] box to learn more about \"Dizziness: Care Instructions. \"     If you do not have an account, please click on the \"Sign Up Now\" link. Current as of: June 26, 2019               Content Version: 12.6  © 5506-2070 [employer], [employer]orporated. Care instructions adapted under license by [facility] ([facility]). If you have questions about a medical condition or this instruction, always ask your healthcare professional. [address] any warranty or liability for your use of this information.

## 2021-01-27 NOTE — PROGRESS NOTES
Subjective:      Paul Blood is a 54 y.o. male who presents for evaluation of hypertension. He indicates that he is feeling well and denies any symptoms referable to his elevated blood pressure. Specifically denies chest pain, palpitations, dyspnea, orthopnea, PND or peripheral edema. Current medication regimen is as listed below. Patient denies any side effects of medication. Erectile Dysfunction: Patient complains of erectile dysfunction. Onset of dysfunction was several years ago and was gradual in onset. Patient states the nature of difficulty is both attaining and maintaining erection. Full erections occur with intercourse. Partial erections occur with intercourse. Libido is not affected. Risk factors for ED include cardiovascular disease. Patient denies history of cranial, spinal, or pelvic trauma and pelvic radiation. Patient's expectations as to sexual function to return to normal.  Patient's description of relationship w/partner good. Previous treatment of ED includes Cialis with good results. Vertigo: Patient complains of rotary vertigo. The symptoms started 1 month ago and has mostly resolved. The attacks occur every a few days and last severalhours. Positions that worsen symptoms: standing up. Previous workup/treatments: none. Associated ear symptoms: none. Associated CNS symptoms: none. Recent infections: none. Head trauma: denied. Drug ingestion: none Noise exposure: no occupational exposure. Family history: non-contributory        Current Outpatient Medications   Medication Sig Dispense Refill    lisinopril (PRINIVIL;ZESTRIL) 5 MG tablet Take 1 tablet by mouth daily 90 tablet 1    tadalafil (CIALIS) 20 MG tablet Take 1 tablet by mouth daily as needed for Erectile Dysfunction 30 tablet 1     No current facility-administered medications for this visit.         No Known Allergies    Social History     Tobacco Use    Smoking status: Never Smoker    Smokeless tobacco: Never Used Substance Use Topics    Alcohol use: Yes     Frequency: 2-3 times a week     Drinks per session: 1 or 2     Binge frequency: Never          Objective:      /72 (Site: Left Upper Arm, Position: Sitting, Cuff Size: Medium Adult)   Pulse 63   Temp 96.6 °F (35.9 °C) (Infrared)   Wt 195 lb 6.4 oz (88.6 kg)   SpO2 98%   BMI 27.64 kg/m²   General: Appears well, in no apparent distress  S1 and S2 normal, no murmurs, clicks, gallops or rubs. Regular rate and rhythm. Chest is clear; no wheezes or rales. No edema or JVD. Neuro: Cranial nerves II through XII grossly intact. Strength 5/5, DTRs 2/4. Finger-to-nose and rapid alternating movements are normal.  Negative Romberg. Normal gait. Assessment:      Hypertension - stable and well controlled  Erectile dysfunctioncontrolled  Labyrinthitis     Plan:     Exercises given for labyrinthitis. Based on normal neurological exam I am not concerned with any intracranial processes. Continue lisinopril 5 mg daily and Cialis 20 mg as needed  1)  Per Orders  2)  Regular aerobic exercise  3)  Sodium Restriction in diet     Shari Blackburn received counseling on the following healthy behaviors: nutrition, exercise and medication adherence    Patient given educational materials on Hypertension    I have instructed Shari Blackburn to complete a self tracking handout on Blood Pressures  and instructed them to bring it with them to his next appointment. Discussed use, benefit, and side effects of prescribed medications. Barriers to medication compliance addressed. All patient questions answered. Pt voiced understanding.

## 2021-01-28 DIAGNOSIS — E78.2 MIXED HYPERLIPIDEMIA: Primary | ICD-10-CM

## 2021-01-28 LAB
ALBUMIN/GLOBULIN RATIO: 1.6 RATIO (ref 0.8–2.6)
ALBUMIN: 4.5 G/DL (ref 3.5–5.2)
ALP BLD-CCNC: 71 U/L (ref 23–144)
ALT SERPL-CCNC: 57 U/L (ref 0–60)
AST SERPL-CCNC: 40 U/L (ref 0–55)
BILIRUB SERPL-MCNC: 0.6 MG/DL (ref 0–1.2)
BUN BLDV-MCNC: 17 MG/DL (ref 3–29)
BUN/CREAT BLD: 13 (ref 7–25)
CALCIUM SERPL-MCNC: 9.7 MG/DL (ref 8.5–10.5)
CHLORIDE BLD-SCNC: 105 MEQ/L (ref 96–110)
CHOLESTEROL: 226 MG/DL
CO2: 21 MEQ/L (ref 19–32)
CREAT SERPL-MCNC: 1.3 MG/DL (ref 0.5–1.4)
FASTING STATUS: ABNORMAL
GFR AFRICAN AMERICAN: 71 MLS/MIN/1.73M2
GFR NON-AFRICAN AMERICAN: 61 MLS/MIN/1.73M2
GLOBULIN: 2.9 G/DL (ref 1.9–3.6)
GLUCOSE BLD-MCNC: 111 MG/DL (ref 70–99)
HDLC SERPL-MCNC: 48 MG/DL
LDL CHOLESTEROL CALCULATED: 159 MG/DL
POTASSIUM SERPL-SCNC: 4.8 MEQ/L (ref 3.4–5.3)
SODIUM BLD-SCNC: 138 MEQ/L (ref 135–148)
STATUS: ABNORMAL
TOTAL PROTEIN: 7.4 G/DL (ref 6–8.3)
TRIGL SERPL-MCNC: 97 MG/DL
VLDLC SERPL CALC-MCNC: 19 MG/DL (ref 4–38)

## 2021-01-28 RX ORDER — ATORVASTATIN CALCIUM 20 MG/1
20 TABLET, FILM COATED ORAL DAILY
Qty: 90 TABLET | Refills: 1 | Status: SHIPPED | OUTPATIENT
Start: 2021-01-28 | End: 2021-06-29 | Stop reason: SDUPTHER

## 2021-06-29 DIAGNOSIS — E78.2 MIXED HYPERLIPIDEMIA: ICD-10-CM

## 2021-06-29 DIAGNOSIS — I10 ESSENTIAL HYPERTENSION: ICD-10-CM

## 2021-06-29 DIAGNOSIS — I10 ESSENTIAL HYPERTENSION: Primary | ICD-10-CM

## 2021-06-29 RX ORDER — LISINOPRIL 5 MG/1
5 TABLET ORAL DAILY
Qty: 30 TABLET | Refills: 0 | Status: SHIPPED | OUTPATIENT
Start: 2021-06-29 | End: 2021-07-21 | Stop reason: SDUPTHER

## 2021-06-29 RX ORDER — ATORVASTATIN CALCIUM 20 MG/1
20 TABLET, FILM COATED ORAL DAILY
Qty: 30 TABLET | Refills: 0 | Status: SHIPPED | OUTPATIENT
Start: 2021-06-29 | End: 2021-07-21 | Stop reason: SDUPTHER

## 2021-07-14 ENCOUNTER — NURSE ONLY (OUTPATIENT)
Dept: FAMILY MEDICINE CLINIC | Age: 56
End: 2021-07-14

## 2021-07-14 DIAGNOSIS — E78.2 MIXED HYPERLIPIDEMIA: ICD-10-CM

## 2021-07-14 DIAGNOSIS — E78.2 MIXED HYPERLIPIDEMIA: Primary | ICD-10-CM

## 2021-07-14 DIAGNOSIS — I10 ESSENTIAL HYPERTENSION: ICD-10-CM

## 2021-07-15 LAB
ALBUMIN/GLOBULIN RATIO: 2 RATIO (ref 0.8–2.6)
ALBUMIN: 4.7 G/DL (ref 3.5–5.2)
ALP BLD-CCNC: 86 U/L (ref 23–144)
ALT SERPL-CCNC: 37 U/L (ref 0–60)
AST SERPL-CCNC: 31 U/L (ref 0–55)
BILIRUB SERPL-MCNC: 0.6 MG/DL (ref 0–1.2)
BUN BLDV-MCNC: 19 MG/DL (ref 3–29)
BUN/CREAT BLD: 16 (ref 7–25)
CALCIUM SERPL-MCNC: 10 MG/DL (ref 8.5–10.5)
CHLORIDE BLD-SCNC: 105 MEQ/L (ref 96–110)
CHOLESTEROL: 151 MG/DL
CO2: 23 MEQ/L (ref 19–32)
CREAT SERPL-MCNC: 1.2 MG/DL (ref 0.5–1.4)
GFR AFRICAN AMERICAN: 79 MLS/MIN/1.73M2
GFR NON-AFRICAN AMERICAN: 68 MLS/MIN/1.73M2
GLOBULIN: 2.3 G/DL (ref 1.9–3.6)
GLUCOSE BLD-MCNC: 112 MG/DL (ref 70–99)
HDLC SERPL-MCNC: 52 MG/DL
LDL CHOLESTEROL CALCULATED: 81 MG/DL
POTASSIUM SERPL-SCNC: 4.6 MEQ/L (ref 3.4–5.3)
SODIUM BLD-SCNC: 141 MEQ/L (ref 135–148)
STATUS: ABNORMAL
TOTAL PROTEIN: 7 G/DL (ref 6–8.3)
TRIGL SERPL-MCNC: 90 MG/DL
VLDLC SERPL CALC-MCNC: 18 MG/DL (ref 4–38)

## 2021-07-21 ENCOUNTER — OFFICE VISIT (OUTPATIENT)
Dept: FAMILY MEDICINE CLINIC | Age: 56
End: 2021-07-21
Payer: COMMERCIAL

## 2021-07-21 VITALS
OXYGEN SATURATION: 98 % | DIASTOLIC BLOOD PRESSURE: 80 MMHG | HEIGHT: 69 IN | BODY MASS INDEX: 28.56 KG/M2 | HEART RATE: 74 BPM | TEMPERATURE: 97.4 F | SYSTOLIC BLOOD PRESSURE: 134 MMHG | WEIGHT: 192.8 LBS

## 2021-07-21 DIAGNOSIS — Z12.11 SCREENING FOR COLON CANCER: ICD-10-CM

## 2021-07-21 DIAGNOSIS — L71.9 ROSACEA: ICD-10-CM

## 2021-07-21 DIAGNOSIS — N52.9 ERECTILE DYSFUNCTION, UNSPECIFIED ERECTILE DYSFUNCTION TYPE: ICD-10-CM

## 2021-07-21 DIAGNOSIS — E78.2 MIXED HYPERLIPIDEMIA: ICD-10-CM

## 2021-07-21 DIAGNOSIS — I10 ESSENTIAL HYPERTENSION: Primary | ICD-10-CM

## 2021-07-21 PROCEDURE — 99214 OFFICE O/P EST MOD 30 MIN: CPT | Performed by: FAMILY MEDICINE

## 2021-07-21 RX ORDER — TADALAFIL 20 MG/1
20 TABLET ORAL DAILY PRN
Qty: 30 TABLET | Refills: 1 | Status: SHIPPED | OUTPATIENT
Start: 2021-07-21 | End: 2022-10-03

## 2021-07-21 RX ORDER — LISINOPRIL 5 MG/1
5 TABLET ORAL DAILY
Qty: 90 TABLET | Refills: 1 | Status: SHIPPED | OUTPATIENT
Start: 2021-07-21 | End: 2022-02-18 | Stop reason: SDUPTHER

## 2021-07-21 RX ORDER — METRONIDAZOLE 7.5 MG/G
GEL TOPICAL
Qty: 45 G | Refills: 0 | Status: SHIPPED | OUTPATIENT
Start: 2021-07-21 | End: 2021-08-02

## 2021-07-21 RX ORDER — ATORVASTATIN CALCIUM 20 MG/1
20 TABLET, FILM COATED ORAL DAILY
Qty: 90 TABLET | Refills: 1 | Status: SHIPPED | OUTPATIENT
Start: 2021-07-21 | End: 2022-02-18 | Stop reason: SDUPTHER

## 2021-07-21 SDOH — ECONOMIC STABILITY: FOOD INSECURITY: WITHIN THE PAST 12 MONTHS, YOU WORRIED THAT YOUR FOOD WOULD RUN OUT BEFORE YOU GOT MONEY TO BUY MORE.: NEVER TRUE

## 2021-07-21 SDOH — ECONOMIC STABILITY: FOOD INSECURITY: WITHIN THE PAST 12 MONTHS, THE FOOD YOU BOUGHT JUST DIDN'T LAST AND YOU DIDN'T HAVE MONEY TO GET MORE.: NEVER TRUE

## 2021-07-21 ASSESSMENT — PATIENT HEALTH QUESTIONNAIRE - PHQ9
SUM OF ALL RESPONSES TO PHQ9 QUESTIONS 1 & 2: 0
SUM OF ALL RESPONSES TO PHQ QUESTIONS 1-9: 0
1. LITTLE INTEREST OR PLEASURE IN DOING THINGS: 0
SUM OF ALL RESPONSES TO PHQ QUESTIONS 1-9: 0
SUM OF ALL RESPONSES TO PHQ QUESTIONS 1-9: 0
2. FEELING DOWN, DEPRESSED OR HOPELESS: 0

## 2021-07-21 ASSESSMENT — SOCIAL DETERMINANTS OF HEALTH (SDOH): HOW HARD IS IT FOR YOU TO PAY FOR THE VERY BASICS LIKE FOOD, HOUSING, MEDICAL CARE, AND HEATING?: NOT HARD AT ALL

## 2021-07-21 NOTE — PROGRESS NOTES
dysfunction type   Well-controlled tadalafil (CIALIS) 20 MG tablet   4. Rosacea   Controlled metroNIDAZOLE (METROGEL) 0.75 % gel   5. Screening for colon cancer  POCT FECAL IMMUNOCHEMICAL TEST (FIT)        Plan:       1)  Medication: continue current medication regimen unchanged  2)  Recheck in 6 months, sooner should new symptoms or problems arise. Abbie St received counseling on the following healthy behaviors: nutrition, exercise and medication adherence    Patient given educational materials on Hypertension    I have instructed Abbie St to complete a self tracking handout on Blood Pressures  and instructed them to bring it with them to his next appointment. Discussed use, benefit, and side effects of prescribed medications. Barriers to medication compliance addressed. All patient questions answered. Pt voiced understanding.

## 2021-07-21 NOTE — PATIENT INSTRUCTIONS
A serving is 1 slice of bread, 1 ounce of dry cereal, or ½ cup of cooked rice, pasta, or cooked cereal. Try to choose whole-grain products as much as possible. · Limit lean meat, poultry, and fish to 2 servings each day. A serving is 3 ounces, about the size of a deck of cards. · Eat 4 to 5 servings of nuts, seeds, and legumes (cooked dried beans, lentils, and split peas) each week. A serving is 1/3 cup of nuts, 2 tablespoons of seeds, or ½ cup of cooked beans or peas. · Limit fats and oils to 2 to 3 servings each day. A serving is 1 teaspoon of vegetable oil or 2 tablespoons of salad dressing. · Limit sweets and added sugars to 5 servings or less a week. A serving is 1 tablespoon jelly or jam, ½ cup sorbet, or 1 cup of lemonade. · Eat less than 2,300 milligrams (mg) of sodium a day. If you limit your sodium to 1,500 mg a day, you can lower your blood pressure even more. · Be aware that all of these are the suggested number of servings for people who eat 1,800 to 2,000 calories a day. Your recommended number of servings may be different if you need more or fewer calories. Tips for success  · Start small. Do not try to make dramatic changes to your diet all at once. You might feel that you are missing out on your favorite foods and then be more likely to not follow the plan. Make small changes, and stick with them. Once those changes become habit, add a few more changes. · Try some of the following:  ? Make it a goal to eat a fruit or vegetable at every meal and at snacks. This will make it easy to get the recommended amount of fruits and vegetables each day. ? Try yogurt topped with fruit and nuts for a snack or healthy dessert. ? Add lettuce, tomato, cucumber, and onion to sandwiches. ? Combine a ready-made pizza crust with low-fat mozzarella cheese and lots of vegetable toppings. Try using tomatoes, squash, spinach, broccoli, carrots, cauliflower, and onions. ?  Have a variety of cut-up vegetables with a low-fat dip as an appetizer instead of chips and dip. ? Sprinkle sunflower seeds or chopped almonds over salads. Or try adding chopped walnuts or almonds to cooked vegetables. ? Try some vegetarian meals using beans and peas. Add garbanzo or kidney beans to salads. Make burritos and tacos with mashed peters beans or black beans. Where can you learn more? Go to https://Chekkt.com.[employer]. org and sign in to your FreshRealm account. Enter M219 in the [employer] box to learn more about \"DASH Diet: Care Instructions. \"     If you do not have an account, please click on the \"Sign Up Now\" link. Current as of: August 31, 2020               Content Version: 12.9  © 3171-5217 Healthwise, Incorporated. Care instructions adapted under license by [facility] ([facility]). If you have questions about a medical condition or this instruction, always ask your healthcare professional. [address]  any warranty or liability for your use of this information.

## 2021-08-02 ENCOUNTER — TELEPHONE (OUTPATIENT)
Dept: FAMILY MEDICINE CLINIC | Age: 56
End: 2021-08-02

## 2021-08-02 DIAGNOSIS — L71.9 ROSACEA: ICD-10-CM

## 2021-08-02 RX ORDER — METRONIDAZOLE 10 MG/G
GEL TOPICAL
Qty: 60 G | Refills: 1 | Status: SHIPPED | OUTPATIENT
Start: 2021-08-02 | End: 2022-10-03

## 2021-08-02 NOTE — TELEPHONE ENCOUNTER
Patient called state picked up his metrogel and it was 0.75% patient state it should be the 1% and wanted to know if you can call in a new script.

## 2021-08-24 ENCOUNTER — OFFICE VISIT (OUTPATIENT)
Dept: FAMILY MEDICINE CLINIC | Age: 56
End: 2021-08-24
Payer: COMMERCIAL

## 2021-08-24 VITALS
WEIGHT: 192.6 LBS | SYSTOLIC BLOOD PRESSURE: 120 MMHG | BODY MASS INDEX: 28.44 KG/M2 | TEMPERATURE: 97.5 F | OXYGEN SATURATION: 97 % | DIASTOLIC BLOOD PRESSURE: 82 MMHG | HEART RATE: 82 BPM

## 2021-08-24 DIAGNOSIS — H66.001 NON-RECURRENT ACUTE SUPPURATIVE OTITIS MEDIA OF RIGHT EAR WITHOUT SPONTANEOUS RUPTURE OF TYMPANIC MEMBRANE: Primary | ICD-10-CM

## 2021-08-24 PROCEDURE — 99213 OFFICE O/P EST LOW 20 MIN: CPT | Performed by: FAMILY MEDICINE

## 2021-08-24 RX ORDER — AMOXICILLIN 500 MG/1
500 CAPSULE ORAL 2 TIMES DAILY
Qty: 14 CAPSULE | Refills: 0 | Status: SHIPPED | OUTPATIENT
Start: 2021-08-24 | End: 2021-08-31

## 2021-08-24 ASSESSMENT — PATIENT HEALTH QUESTIONNAIRE - PHQ9
SUM OF ALL RESPONSES TO PHQ9 QUESTIONS 1 & 2: 0
SUM OF ALL RESPONSES TO PHQ QUESTIONS 1-9: 0
1. LITTLE INTEREST OR PLEASURE IN DOING THINGS: 0
SUM OF ALL RESPONSES TO PHQ QUESTIONS 1-9: 0
2. FEELING DOWN, DEPRESSED OR HOPELESS: 0
SUM OF ALL RESPONSES TO PHQ QUESTIONS 1-9: 0

## 2021-08-24 NOTE — PROGRESS NOTES
SUBJECTIVE:  Brenda Harrington is a 54 y.o. male with 3 day(s) history of pain in right ear. Temperature not elevated at home. OBJECTIVE:  /82 (Site: Left Upper Arm, Position: Sitting, Cuff Size: Medium Adult)   Pulse 82   Temp 97.5 °F (36.4 °C) (Infrared)   Wt 192 lb 9.6 oz (87.4 kg)   SpO2 97%   BMI 28.44 kg/m²   General appearance: alert, well appearing, and in no distress, oriented to person, place, and time and normal appearing weight. Ears: left ear normal, right TM red, dull, bulging  Nose: normal and patent, no erythema, discharge or polyps  Oropharynx: mucous membranes moist, pharynx normal without lesions  Neck: supple, no significant adenopathy  Lungs: clear to auscultation, no wheezes, rales or rhonchi, symmetric air entry    ASSESSMENT:  Otitis Media    PLAN: Amoxicillin 500 mg twice daily for 7 days  1) See orders for this visit as documented in the electronic medical record. 2) Symptomatic therapy suggested: use acetaminophen, ibuprofen prn. 3) Call or return to clinic prn if these symptoms worsen or fail to improve as anticipated.

## 2021-08-24 NOTE — PATIENT INSTRUCTIONS
Patient Education        Ear Infection (Otitis Media): Care Instructions  Overview     An ear infection may start with a cold and affect the middle ear (otitis media). It can hurt a lot. Most ear infections clear up on their own in a couple of days and do not need antibiotics. Also, antibiotics do not work against viruses, which may be the cause of your infection. Regular doses of pain relievers are the best way to reduce your fever and help you feel better. Follow-up care is a key part of your treatment and safety. Be sure to make and go to all appointments, and call your doctor if you are having problems. It's also a good idea to know your test results and keep a list of the medicines you take. How can you care for yourself at home? · Take pain medicines exactly as directed. ? If the doctor gave you a prescription medicine for pain, take it as prescribed. ? If you are not taking a prescription pain medicine, take an over-the-counter medicine, such as acetaminophen (Tylenol), ibuprofen (Advil, Motrin), or naproxen (Aleve). Read and follow all instructions on the label. ? Do not take two or more pain medicines at the same time unless the doctor told you to. Many pain medicines have acetaminophen, which is Tylenol. Too much acetaminophen (Tylenol) can be harmful. · Plan to take a full dose of pain reliever before bedtime. Getting enough sleep will help you get better. · Try a warm, moist washcloth on the ear. It may help relieve pain. · If your doctor prescribed antibiotics, take them as directed. Do not stop taking them just because you feel better. You need to take the full course of antibiotics. When should you call for help? Call your doctor now or seek immediate medical care if:    · You have new or increasing ear pain.     · You have new or increasing pus or blood draining from your ear.     · You have a fever with a stiff neck or a severe headache.    Watch closely for changes in your health, and be sure to contact your doctor if:    · You have new or worse symptoms.     · You are not getting better after taking an antibiotic for 2 days. Where can you learn more? Go to https://[employer]. org and sign in to your BlenderHouse account. Enter G542 in the [employer] box to learn more about \"Ear Infection (Otitis Media): Care Instructions. \"     If you do not have an account, please click on the \"Sign Up Now\" link. Current as of: December 2, 2020               Content Version: 12.9  © 7892-7276 Healthwise, Incorporated. Care instructions adapted under license by [facility] ([facility]). If you have questions about a medical condition or this instruction, always ask your healthcare professional. [address] any warranty or liability for your use of this information.

## 2021-08-25 DIAGNOSIS — Z12.11 COLON CANCER SCREENING: ICD-10-CM

## 2021-08-25 LAB
CONTROL: NORMAL
HEMOCCULT STL QL: NEGATIVE

## 2021-08-25 PROCEDURE — 82274 ASSAY TEST FOR BLOOD FECAL: CPT | Performed by: FAMILY MEDICINE

## 2021-09-09 ENCOUNTER — TELEPHONE (OUTPATIENT)
Dept: FAMILY MEDICINE CLINIC | Age: 56
End: 2021-09-09

## 2021-09-09 NOTE — TELEPHONE ENCOUNTER
----- Message from Chandler Espitia sent at 9/9/2021  8:08 AM EDT -----  Subject: Message to Provider    QUESTIONS  Information for Provider? Patient was treated for an ear infection at one   point from doctor, it went away, then ended up [place] back again [place] it has since gotten better again, but he is   wanting to know what preventative measures he can take to prevent from   getting the ear infections so easily in the future, is an inconvenience to   him. Thank You.   ---------------------------------------------------------------------------  --------------  Karson Muse INFO  What is the best way for the office to contact you? OK to leave message on   voicemail  Preferred Call Back Phone Number? [phone]  ---------------------------------------------------------------------------  --------------  SCRIPT ANSWERS  Relationship to Patient?  Self

## 2021-09-21 ENCOUNTER — OFFICE VISIT (OUTPATIENT)
Dept: FAMILY MEDICINE CLINIC | Age: 56
End: 2021-09-21
Payer: COMMERCIAL

## 2021-09-21 VITALS
WEIGHT: 192.4 LBS | HEART RATE: 85 BPM | DIASTOLIC BLOOD PRESSURE: 86 MMHG | BODY MASS INDEX: 28.41 KG/M2 | SYSTOLIC BLOOD PRESSURE: 128 MMHG | TEMPERATURE: 97.6 F | OXYGEN SATURATION: 98 %

## 2021-09-21 DIAGNOSIS — H93.11 TINNITUS OF RIGHT EAR: ICD-10-CM

## 2021-09-21 DIAGNOSIS — H69.81 DYSFUNCTION OF RIGHT EUSTACHIAN TUBE: Primary | ICD-10-CM

## 2021-09-21 PROCEDURE — 99213 OFFICE O/P EST LOW 20 MIN: CPT | Performed by: FAMILY MEDICINE

## 2021-09-21 NOTE — PROGRESS NOTES
Tinnitus: Patient presents with tinnitus. Onset of symptoms was gradual 1 week ago ago with unchanged course since that time. Patient describes the tinnitus as recurrent located in the right ear. The quality is described as medium range pitch that sounds like crickets. The pattern is nonpulsatile with an intensity that is medium. Patient describes his level of annoyance as minimally annoying, intermittently aware. Associated symptoms include no hearing loss, pain, dizziness, drainage or recurrent otitis. Family history is negative family history for tinnitus Patient has had a prior evaluation for tinnitus by me. Patient does not have hearing aids at this time. Previous treatments include none. O:   Vitals:    09/21/21 0916   BP: 128/86   Pulse:    Temp:    SpO2:      No acute distress. Alert and Oriented x 3  HEENT: Atraumatic. Normocephalic. PERRLA, EOMI, Conjunctiva clear  Oropharynx clear, mucosa moist.  Nasal mucosa normal  NECK: without thyromegaly, lymphadenopathy, JVD  LUNGS:Clear to ascultation bilaterally. Breathing comfortably  CARDIOVASCULAR:  Regular rate and rhythm, no murmurs, rubs, or gallops    A:    Diagnosis Orders   1. Dysfunction of right eustachian tube     2. Tinnitus of right ear       P: Recommend using Flonase 2 sprays each nostril daily. If symptoms not resolved in 2 weeks, will send to ENT. Follow-up as needed.

## 2022-02-18 DIAGNOSIS — E78.2 MIXED HYPERLIPIDEMIA: ICD-10-CM

## 2022-02-18 DIAGNOSIS — I10 ESSENTIAL HYPERTENSION: ICD-10-CM

## 2022-02-18 RX ORDER — ATORVASTATIN CALCIUM 20 MG/1
20 TABLET, FILM COATED ORAL DAILY
Qty: 30 TABLET | Refills: 0 | Status: SHIPPED | OUTPATIENT
Start: 2022-02-18 | End: 2022-03-14 | Stop reason: SDUPTHER

## 2022-02-18 RX ORDER — LISINOPRIL 5 MG/1
5 TABLET ORAL DAILY
Qty: 30 TABLET | Refills: 0 | Status: SHIPPED | OUTPATIENT
Start: 2022-02-18 | End: 2022-03-14 | Stop reason: SDUPTHER

## 2022-02-18 RX ORDER — LISINOPRIL 5 MG/1
TABLET ORAL
Qty: 90 TABLET | Refills: 1 | OUTPATIENT
Start: 2022-02-18

## 2022-02-18 RX ORDER — ATORVASTATIN CALCIUM 20 MG/1
TABLET, FILM COATED ORAL
Qty: 90 TABLET | Refills: 1 | OUTPATIENT
Start: 2022-02-18

## 2022-03-14 ENCOUNTER — OFFICE VISIT (OUTPATIENT)
Dept: FAMILY MEDICINE CLINIC | Age: 57
End: 2022-03-14
Payer: COMMERCIAL

## 2022-03-14 VITALS
WEIGHT: 194.25 LBS | OXYGEN SATURATION: 99 % | SYSTOLIC BLOOD PRESSURE: 132 MMHG | TEMPERATURE: 96.8 F | HEART RATE: 71 BPM | BODY MASS INDEX: 28.69 KG/M2 | DIASTOLIC BLOOD PRESSURE: 88 MMHG

## 2022-03-14 DIAGNOSIS — E78.2 MIXED HYPERLIPIDEMIA: ICD-10-CM

## 2022-03-14 DIAGNOSIS — I10 ESSENTIAL HYPERTENSION: ICD-10-CM

## 2022-03-14 DIAGNOSIS — H93.12 TINNITUS, LEFT EAR: Primary | ICD-10-CM

## 2022-03-14 PROCEDURE — 99214 OFFICE O/P EST MOD 30 MIN: CPT | Performed by: FAMILY MEDICINE

## 2022-03-14 RX ORDER — ATORVASTATIN CALCIUM 20 MG/1
20 TABLET, FILM COATED ORAL DAILY
Qty: 90 TABLET | Refills: 1 | Status: SHIPPED | OUTPATIENT
Start: 2022-03-14 | End: 2022-10-03 | Stop reason: SDUPTHER

## 2022-03-14 RX ORDER — LISINOPRIL 5 MG/1
5 TABLET ORAL DAILY
Qty: 90 TABLET | Refills: 1 | Status: SHIPPED | OUTPATIENT
Start: 2022-03-14 | End: 2022-10-03 | Stop reason: SDUPTHER

## 2022-03-14 ASSESSMENT — PATIENT HEALTH QUESTIONNAIRE - PHQ9
2. FEELING DOWN, DEPRESSED OR HOPELESS: 0
SUM OF ALL RESPONSES TO PHQ QUESTIONS 1-9: 0
SUM OF ALL RESPONSES TO PHQ QUESTIONS 1-9: 0
SUM OF ALL RESPONSES TO PHQ9 QUESTIONS 1 & 2: 0
SUM OF ALL RESPONSES TO PHQ QUESTIONS 1-9: 0
1. LITTLE INTEREST OR PLEASURE IN DOING THINGS: 0
SUM OF ALL RESPONSES TO PHQ QUESTIONS 1-9: 0

## 2022-03-14 NOTE — PROGRESS NOTES
Subjective:      Vesta Garcia is a 64 y.o. male who presents for evaluation of hypertension and hyperlipidemia. He indicates that he is feeling well and denies any symptoms referable to his elevated blood pressure. Specifically denies chest pain, palpitations, dyspnea, orthopnea, PND or peripheral edema. No anorexia, arthralgia, or leg cramps noted. Current medication regimen is as listed below. He denies any side effects of medication, and has been taking it regularly. Left ear tinnitus intermittently for months, persistent for several days. Restarted Flonase which isn't helping. Concerned about testosterone level. Difficulty losing weight. Current Outpatient Medications   Medication Sig Dispense Refill    atorvastatin (LIPITOR) 20 MG tablet Take 1 tablet by mouth daily 30 tablet 0    lisinopril (PRINIVIL;ZESTRIL) 5 MG tablet Take 1 tablet by mouth daily 30 tablet 0    metroNIDAZOLE (METROGEL) 1 % gel Apply topically daily. 60 g 1    tadalafil (CIALIS) 20 MG tablet Take 1 tablet by mouth daily as needed for Erectile Dysfunction 30 tablet 1     No current facility-administered medications for this visit. No Known Allergies    Social History     Tobacco Use    Smoking status: Never Smoker    Smokeless tobacco: Never Used   Substance Use Topics    Alcohol use: Yes          Objective:      /88 (Site: Left Upper Arm, Position: Sitting, Cuff Size: Medium Adult)   Pulse 71   Temp 96.8 °F (36 °C) (Infrared)   Wt 194 lb 4 oz (88.1 kg)   SpO2 99%   BMI 28.69 kg/m²   General: Alert and oriented, in no distress   HEENT: Atraumatic, normocephalic, pupils equal, round, regular, reactive to light and accommodation. Oropharynx clear. TMs pearly gray. Left slightly retracted. No fluid  S1 and S2 normal, no murmurs, clicks, gallops or rubs. Regular rate and rhythm. Chest is clear; no wheezes or rales. No edema or JVD. Assessment:       Diagnosis Orders   1.  Tinnitus, left ear

## 2022-04-07 ENCOUNTER — TELEPHONE (OUTPATIENT)
Dept: FAMILY MEDICINE CLINIC | Age: 57
End: 2022-04-07

## 2022-04-07 NOTE — TELEPHONE ENCOUNTER
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 In order to get your 's license you have to have a minimum of a class III medical exam through aeromedical examiner.

## 2022-04-08 NOTE — TELEPHONE ENCOUNTER
Attempted to contact the patient regarding his medical exam request, but phone rang busy and attempt was unsuccessful.

## 2022-04-11 NOTE — TELEPHONE ENCOUNTER
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  Patient expressed verbal understanding

## 2022-07-25 ENCOUNTER — TELEPHONE (OUTPATIENT)
Dept: FAMILY MEDICINE CLINIC | Age: 57
End: 2022-07-25

## 2022-07-25 NOTE — TELEPHONE ENCOUNTER
----- Message from [facility] MOUSTAPHA Mckeon sent at 7/25/2022  8:16 AM EDT -----  Subject: Message to Provider    QUESTIONS  Information for Provider? The pt called and would like to schedule a   cortisone shot or be referred to someone for his right shoulder after its   become inflamed over the weekend after renovating an apartment. The pt   stated he has had prior problems with this shoulder and doesn't need a   consultation just need an order and preferably someone within the next   three days. ---------------------------------------------------------------------------  --------------  Sylvia SINGER  [phone]; OK to leave message on voicemail  ---------------------------------------------------------------------------  --------------  SCRIPT ANSWERS  Relationship to Patient?  Self

## 2022-07-25 NOTE — TELEPHONE ENCOUNTER
Please schedule appointment she can be evaluated to determine the appropriate treatment. He can have the same day.

## 2022-07-27 ENCOUNTER — TELEPHONE (OUTPATIENT)
Dept: FAMILY MEDICINE CLINIC | Age: 57
End: 2022-07-27

## 2022-07-27 NOTE — TELEPHONE ENCOUNTER
----- Message from [facility] MECCA Mckeon sent at 7/25/2022  8:16 AM EDT -----  Subject: Message to Provider    QUESTIONS  Information for Provider? The pt called and would like to schedule a   cortisone shot or be referred to someone for his right shoulder after its   become inflamed over the weekend after renovating an apartment. The pt   stated he has had prior problems with this shoulder and doesn't need a   consultation just need an order and preferably someone within the next   three days. ---------------------------------------------------------------------------  --------------  Shawanda Medrano MDUQ  [phone]; OK to leave message on voicemail  ---------------------------------------------------------------------------  --------------  SCRIPT ANSWERS  Relationship to Patient?  Self

## 2022-07-27 NOTE — TELEPHONE ENCOUNTER
----- Message from [facility] MECCA Mckeon sent at 7/25/2022  8:16 AM EDT -----  Subject: Message to Provider    QUESTIONS  Information for Provider? The pt called and would like to schedule a   cortisone shot or be referred to someone for his right shoulder after its   become inflamed over the weekend after renovating an apartment. The pt   stated he has had prior problems with this shoulder and doesn't need a   consultation just need an order and preferably someone within the next   three days. ---------------------------------------------------------------------------  --------------  Makayla ROSARIO  [phone]; OK to leave message on voicemail  ---------------------------------------------------------------------------  --------------  SCRIPT ANSWERS  Relationship to Patient?  Self

## 2022-09-28 DIAGNOSIS — E78.2 MIXED HYPERLIPIDEMIA: ICD-10-CM

## 2022-09-28 DIAGNOSIS — I10 ESSENTIAL HYPERTENSION: ICD-10-CM

## 2022-09-30 RX ORDER — LISINOPRIL 5 MG/1
TABLET ORAL
Qty: 90 TABLET | Refills: 1 | OUTPATIENT
Start: 2022-09-30

## 2022-09-30 RX ORDER — ATORVASTATIN CALCIUM 20 MG/1
TABLET, FILM COATED ORAL
Qty: 90 TABLET | Refills: 1 | OUTPATIENT
Start: 2022-09-30

## 2022-10-03 ENCOUNTER — OFFICE VISIT (OUTPATIENT)
Dept: FAMILY MEDICINE CLINIC | Age: 57
End: 2022-10-03
Payer: COMMERCIAL

## 2022-10-03 VITALS
SYSTOLIC BLOOD PRESSURE: 122 MMHG | HEIGHT: 69 IN | HEART RATE: 98 BPM | OXYGEN SATURATION: 98 % | BODY MASS INDEX: 28.53 KG/M2 | TEMPERATURE: 97.2 F | DIASTOLIC BLOOD PRESSURE: 84 MMHG | WEIGHT: 192.6 LBS

## 2022-10-03 DIAGNOSIS — Z12.11 COLON CANCER SCREENING: ICD-10-CM

## 2022-10-03 DIAGNOSIS — I10 ESSENTIAL HYPERTENSION: Primary | ICD-10-CM

## 2022-10-03 DIAGNOSIS — E78.2 MIXED HYPERLIPIDEMIA: ICD-10-CM

## 2022-10-03 DIAGNOSIS — R41.3 MEMORY CHANGES: ICD-10-CM

## 2022-10-03 PROCEDURE — 99214 OFFICE O/P EST MOD 30 MIN: CPT | Performed by: FAMILY MEDICINE

## 2022-10-03 RX ORDER — LISINOPRIL 5 MG/1
5 TABLET ORAL DAILY
Qty: 90 TABLET | Refills: 1 | Status: SHIPPED | OUTPATIENT
Start: 2022-10-03

## 2022-10-03 RX ORDER — ATORVASTATIN CALCIUM 20 MG/1
20 TABLET, FILM COATED ORAL DAILY
Qty: 90 TABLET | Refills: 1 | Status: SHIPPED | OUTPATIENT
Start: 2022-10-03

## 2022-10-03 ASSESSMENT — PATIENT HEALTH QUESTIONNAIRE - PHQ9
SUM OF ALL RESPONSES TO PHQ QUESTIONS 1-9: 0
2. FEELING DOWN, DEPRESSED OR HOPELESS: 0
1. LITTLE INTEREST OR PLEASURE IN DOING THINGS: 0
SUM OF ALL RESPONSES TO PHQ9 QUESTIONS 1 & 2: 0

## 2022-10-03 NOTE — PROGRESS NOTES
Subjective:      Audi Nicholas is a 64 y.o. male who presents for evaluation of hypertension and hyperlipidemia. He indicates that he is feeling well and denies any symptoms referable to his elevated blood pressure. Specifically denies chest pain, palpitations, dyspnea, orthopnea, PND or peripheral edema. No anorexia, arthralgia, or leg cramps noted. Current medication regimen is as listed below. He denies any side effects of medication, and has been taking it regularly. Having some memory issues which are getting worse. Current Outpatient Medications   Medication Sig Dispense Refill    atorvastatin (LIPITOR) 20 MG tablet Take 1 tablet by mouth daily 90 tablet 1    lisinopril (PRINIVIL;ZESTRIL) 5 MG tablet Take 1 tablet by mouth daily 90 tablet 1    metroNIDAZOLE (METROGEL) 1 % gel Apply topically daily. 60 g 1    tadalafil (CIALIS) 20 MG tablet Take 1 tablet by mouth daily as needed for Erectile Dysfunction 30 tablet 1     No current facility-administered medications for this visit. No Known Allergies    Social History     Tobacco Use    Smoking status: Never    Smokeless tobacco: Never   Substance Use Topics    Alcohol use: Yes          Objective:      /84   Pulse 98   Temp 97.2 °F (36.2 °C) (Infrared)   Ht 5' 9\" (1.753 m)   Wt 192 lb 9.6 oz (87.4 kg)   SpO2 98%   BMI 28.44 kg/m²   General: Alert and oriented, in no distress   S1 and S2 normal, no murmurs, clicks, gallops or rubs. Regular rate and rhythm. Chest is clear; no wheezes or rales. No edema or JVD. MMSE 28/30     Assessment:      Essential hypertension - well controlled and stable  Hyperlipidemia - asymptomatic  Memory changes-likely due to stress/being too busy     Plan:   Recommended Puzzles, find a word, activities to keep his brain active. 1)  Medication: continue current medication regimen unchanged  2)  Recheck in 6 months, sooner should new symptoms or problems arise.      Isela Santos received counseling on the following healthy behaviors: nutrition, exercise, and medication adherence    Patient given educational materials on Hypertension    I have instructed Johnny Whiteside to complete a self tracking handout on Blood Pressures  and instructed them to bring it with them to his next appointment. Discussed use, benefit, and side effects of prescribed medications. Barriers to medication compliance addressed. All patient questions answered. Pt voiced understanding.

## 2022-10-05 ENCOUNTER — COMMUNITY OUTREACH (OUTPATIENT)
Dept: FAMILY MEDICINE CLINIC | Age: 57
End: 2022-10-05

## 2022-10-15 LAB
A/G RATIO: 1.8 (ref 1.2–2.2)
ALBUMIN SERPL-MCNC: 4.6 G/DL (ref 3.8–4.9)
ALP BLD-CCNC: 79 IU/L (ref 44–121)
ALT SERPL-CCNC: 43 IU/L (ref 0–44)
AMBIGUOUS ABBREVIATION: NORMAL
AMBIGUOUS ABBREVIATION: NORMAL
AST SERPL-CCNC: 30 IU/L (ref 0–40)
BILIRUB SERPL-MCNC: 0.9 MG/DL (ref 0–1.2)
BUN / CREAT RATIO: 18 (ref 9–20)
BUN BLDV-MCNC: 21 MG/DL (ref 6–24)
CALCIUM SERPL-MCNC: 9.8 MG/DL (ref 8.7–10.2)
CHLORIDE BLD-SCNC: 104 MMOL/L (ref 96–106)
CHOLESTEROL, TOTAL: 162 MG/DL (ref 100–199)
CO2: 22 MMOL/L (ref 20–29)
COMMENT: NORMAL
CREAT SERPL-MCNC: 1.17 MG/DL (ref 0.76–1.27)
ESTIMATED GLOMERULAR FILTRATION RATE CREATININE EQUATION: 73 ML/MIN/1.73
GLOBULIN: 2.6 G/DL (ref 1.5–4.5)
GLUCOSE BLD-MCNC: 107 MG/DL (ref 70–99)
HDLC SERPL-MCNC: 46 MG/DL
LDL CHOLESTEROL CALCULATED: 95 MG/DL (ref 0–99)
POTASSIUM SERPL-SCNC: 4.7 MMOL/L (ref 3.5–5.2)
SODIUM BLD-SCNC: 138 MMOL/L (ref 134–144)
TOTAL PROTEIN: 7.2 G/DL (ref 6–8.5)
TRIGL SERPL-MCNC: 114 MG/DL (ref 0–149)
VLDLC SERPL CALC-MCNC: 21 MG/DL (ref 5–40)

## 2023-01-13 ENCOUNTER — TELEPHONE (OUTPATIENT)
Dept: FAMILY MEDICINE CLINIC | Age: 58
End: 2023-01-13

## 2023-01-13 NOTE — TELEPHONE ENCOUNTER
Patient called requesting refill of Cialis. He stated it has been awhile since he had it filled. He uses [facility] in [place].

## 2023-01-16 DIAGNOSIS — N52.9 ERECTILE DYSFUNCTION, UNSPECIFIED ERECTILE DYSFUNCTION TYPE: ICD-10-CM

## 2023-01-16 RX ORDER — TADALAFIL 20 MG/1
20 TABLET ORAL DAILY PRN
Qty: 30 TABLET | Refills: 1 | Status: SHIPPED | OUTPATIENT
Start: 2023-01-16

## 2023-04-11 DIAGNOSIS — I10 ESSENTIAL HYPERTENSION: ICD-10-CM

## 2023-04-11 RX ORDER — LISINOPRIL 5 MG/1
TABLET ORAL
Qty: 90 TABLET | Refills: 1 | OUTPATIENT
Start: 2023-04-11

## 2023-04-18 ENCOUNTER — TELEPHONE (OUTPATIENT)
Dept: FAMILY MEDICINE CLINIC | Age: 58
End: 2023-04-18

## 2023-07-12 NOTE — PATIENT INSTRUCTIONS
Patient Education        DASH Diet: Care Instructions  Your Care Instructions     The DASH diet is an eating plan that can help lower your blood pressure. DASH stands for Dietary Approaches to Stop Hypertension. Hypertension is high blood pressure. The DASH diet focuses on eating foods that are high in calcium, potassium, and magnesium. These nutrients can lower blood pressure. The foods that are highest in these nutrients are fruits, vegetables, low-fat dairy products, nuts, seeds, and legumes. But taking calcium, potassium, and magnesium supplements instead of eating foods that are high in those nutrients does not have the same effect. The DASH diet also includes whole grains, fish, and poultry. The DASH diet is one of several lifestyle changes your doctor may recommend to lower your high blood pressure. Your doctor may also want you to decrease the amount of sodium in your diet. Lowering sodium while following the DASH diet can lower blood pressure even further than just the DASH diet alone. Follow-up care is a key part of your treatment and safety. Be sure to make and go to all appointments, and call your doctor if you are having problems. It's also a good idea to know your test results and keep a list of the medicines you take. How can you care for yourself at home? Following the DASH diet  · Eat 4 to 5 servings of fruit each day. A serving is 1 medium-sized piece of fruit, ½ cup chopped or canned fruit, 1/4 cup dried fruit, or 4 ounces (½ cup) of fruit juice. Choose fruit more often than fruit juice. · Eat 4 to 5 servings of vegetables each day. A serving is 1 cup of lettuce or raw leafy vegetables, ½ cup of chopped or cooked vegetables, or 4 ounces (½ cup) of vegetable juice. Choose vegetables more often than vegetable juice. · Get 2 to 3 servings of low-fat and fat-free dairy each day. A serving is 8 ounces of milk, 1 cup of yogurt, or 1 ½ ounces of cheese. · Eat 6 to 8 servings of grains each day. A serving is 1 slice of bread, 1 ounce of dry cereal, or ½ cup of cooked rice, pasta, or cooked cereal. Try to choose whole-grain products as much as possible. · Limit lean meat, poultry, and fish to 2 servings each day. A serving is 3 ounces, about the size of a deck of cards. · Eat 4 to 5 servings of nuts, seeds, and legumes (cooked dried beans, lentils, and split peas) each week. A serving is 1/3 cup of nuts, 2 tablespoons of seeds, or ½ cup of cooked beans or peas. · Limit fats and oils to 2 to 3 servings each day. A serving is 1 teaspoon of vegetable oil or 2 tablespoons of salad dressing. · Limit sweets and added sugars to 5 servings or less a week. A serving is 1 tablespoon jelly or jam, ½ cup sorbet, or 1 cup of lemonade. · Eat less than 2,300 milligrams (mg) of sodium a day. If you limit your sodium to 1,500 mg a day, you can lower your blood pressure even more. · Be aware that all of these are the suggested number of servings for people who eat 1,800 to 2,000 calories a day. Your recommended number of servings may be different if you need more or fewer calories. Tips for success  · Start small. Do not try to make dramatic changes to your diet all at once. You might feel that you are missing out on your favorite foods and then be more likely to not follow the plan. Make small changes, and stick with them. Once those changes become habit, add a few more changes. · Try some of the following:  ? Make it a goal to eat a fruit or vegetable at every meal and at snacks. This will make it easy to get the recommended amount of fruits and vegetables each day. ? Try yogurt topped with fruit and nuts for a snack or healthy dessert. ? Add lettuce, tomato, cucumber, and onion to sandwiches. ? Combine a ready-made pizza crust with low-fat mozzarella cheese and lots of vegetable toppings. Try using tomatoes, squash, spinach, broccoli, carrots, cauliflower, and onions. ?  Have a variety of cut-up vegetables with a low-fat dip as an appetizer instead of chips and dip. ? Sprinkle sunflower seeds or chopped almonds over salads. Or try adding chopped walnuts or almonds to cooked vegetables. ? Try some vegetarian meals using beans and peas. Add garbanzo or kidney beans to salads. Make burritos and tacos with mashed peters beans or black beans. Where can you learn more? Go to https://[email or web address].yoonew. org and sign in to your [employer] account. Enter P989 in the Pango box to learn more about \"DASH Diet: Care Instructions. \"     If you do not have an account, please click on the \"Sign Up Now\" link. Current as of: April 29, 2021               Content Version: 13.1  © 4680-1700 [employer]. Care instructions adapted under license by [facility] ([facility]). If you have questions about a medical condition or this instruction, always ask your healthcare professional. [address] any warranty or liability for your use of this information. Inflammation Suggestive Of Cancer Camouflage Histology Text: There was a dense lymphocytic infiltrate which prevented adequate histologic evaluation of adjacent structures.

## 2023-09-13 DIAGNOSIS — E78.2 MIXED HYPERLIPIDEMIA: ICD-10-CM

## 2023-09-13 DIAGNOSIS — I10 ESSENTIAL HYPERTENSION: ICD-10-CM

## 2023-09-16 RX ORDER — ROSUVASTATIN CALCIUM 5 MG/1
5 TABLET, COATED ORAL DAILY
Qty: 30 TABLET | Refills: 0 | Status: SHIPPED | OUTPATIENT
Start: 2023-09-16 | End: 2023-10-11 | Stop reason: SDUPTHER

## 2023-09-16 RX ORDER — LISINOPRIL 5 MG/1
5 TABLET ORAL DAILY
Qty: 30 TABLET | Refills: 0 | Status: SHIPPED | OUTPATIENT
Start: 2023-09-16 | End: 2023-10-11 | Stop reason: SDUPTHER

## 2023-09-29 ENCOUNTER — APPOINTMENT (RX ONLY)
Dept: URBAN - METROPOLITAN AREA CLINIC 377 | Facility: CLINIC | Age: 58
Setting detail: DERMATOLOGY
End: 2023-09-29

## 2023-09-29 DIAGNOSIS — L21.8 OTHER SEBORRHEIC DERMATITIS: ICD-10-CM | Status: INADEQUATELY CONTROLLED

## 2023-09-29 DIAGNOSIS — L65.9 NONSCARRING HAIR LOSS, UNSPECIFIED: ICD-10-CM | Status: INADEQUATELY CONTROLLED

## 2023-09-29 PROBLEM — L30.9 DERMATITIS, UNSPECIFIED: Status: ACTIVE | Noted: 2023-09-29

## 2023-09-29 PROCEDURE — ? PRESCRIPTION MEDICATION MANAGEMENT

## 2023-09-29 PROCEDURE — ? COUNSELING

## 2023-09-29 PROCEDURE — 99204 OFFICE O/P NEW MOD 45 MIN: CPT

## 2023-09-29 PROCEDURE — ? PRESCRIPTION

## 2023-09-29 PROCEDURE — ? OTHER

## 2023-09-29 PROCEDURE — ? TREATMENT REGIMEN

## 2023-09-29 RX ORDER — PREDNISONE 10 MG/1
TABLET ORAL QD
Qty: 7 | Refills: 0 | Status: ERX | COMMUNITY
Start: 2023-09-29

## 2023-09-29 RX ORDER — KETOCONAZOLE 20 MG/ML
SHAMPOO, SUSPENSION TOPICAL QOD
Qty: 120 | Refills: 5 | Status: ERX | COMMUNITY
Start: 2023-09-29

## 2023-09-29 RX ORDER — FLUOCINONIDE 0.5 MG/ML
SOLUTION TOPICAL QD
Qty: 60 | Refills: 5 | Status: ERX | COMMUNITY
Start: 2023-09-29

## 2023-09-29 RX ADMIN — KETOCONAZOLE: 20 SHAMPOO, SUSPENSION TOPICAL at 00:00

## 2023-09-29 RX ADMIN — PREDNISONE: 10 TABLET ORAL at 00:00

## 2023-09-29 RX ADMIN — FLUOCINONIDE: 0.5 SOLUTION TOPICAL at 00:00

## 2023-09-29 ASSESSMENT — LOCATION DETAILED DESCRIPTION DERM
LOCATION DETAILED: LEFT SUPERIOR PARIETAL SCALP
LOCATION DETAILED: RIGHT MEDIAL FRONTAL SCALP
LOCATION DETAILED: RIGHT SUPERIOR PARIETAL SCALP

## 2023-09-29 ASSESSMENT — SEVERITY ASSESSMENT: HOW SEVERE IS THIS PATIENT'S CONDITION?: MODERATE

## 2023-09-29 ASSESSMENT — LOCATION SIMPLE DESCRIPTION DERM
LOCATION SIMPLE: RIGHT SCALP
LOCATION SIMPLE: SCALP

## 2023-09-29 ASSESSMENT — LOCATION ZONE DERM: LOCATION ZONE: SCALP

## 2023-09-29 NOTE — PROCEDURE: PRESCRIPTION MEDICATION MANAGEMENT
Detail Level: Zone
Render In Strict Bullet Format?: No
Plan: Consider punch biopsy if inflammation does not improve
Initiate Treatment: Fluocinonide drops bid\\nKetoconazole shampoo qod\\nPrednisone qd for 7 days
Initiate Treatment: KETOCONAZOLE SHAMPOO FLUOCINANIDE SLOUTION AND ORAL PREDNISONE

## 2023-09-29 NOTE — HPI: RASH (SEBORRHEIC DERMATITIS)
How Severe Is It?: moderate
Is This A New Presentation, Or A Follow-Up?: Rash
Additional History: Did have sunburn on scalp as well

## 2023-09-29 NOTE — PROCEDURE: OTHER
Note Text (......Xxx Chief Complaint.): This diagnosis correlates with the
Render Risk Assessment In Note?: no
Other (Free Text): CONSIDER PUNCH BIOPSY AT FOLLOW UP
Detail Level: Zone

## 2023-09-29 NOTE — HPI: HAIR LOSS
Previous Labs: No
How Did The Hair Loss Occur?: sudden in onset
How Severe Is Your Hair Loss?: moderate
What Hair Products Do You Use?: Nizoral

## 2023-10-11 ENCOUNTER — TELEPHONE (OUTPATIENT)
Dept: FAMILY MEDICINE CLINIC | Age: 58
End: 2023-10-11

## 2023-10-11 DIAGNOSIS — E78.2 MIXED HYPERLIPIDEMIA: ICD-10-CM

## 2023-10-11 DIAGNOSIS — I10 ESSENTIAL HYPERTENSION: ICD-10-CM

## 2023-10-11 RX ORDER — ROSUVASTATIN CALCIUM 5 MG/1
5 TABLET, COATED ORAL DAILY
Qty: 30 TABLET | Refills: 0 | Status: SHIPPED | OUTPATIENT
Start: 2023-10-11

## 2023-10-11 RX ORDER — LISINOPRIL 5 MG/1
5 TABLET ORAL DAILY
Qty: 30 TABLET | Refills: 0 | Status: SHIPPED | OUTPATIENT
Start: 2023-10-11

## 2023-10-11 NOTE — TELEPHONE ENCOUNTER
----- Message from Fausto Millard sent at 10/11/2023  8:55 AM EDT -----  Subject: Appointment Request    Reason for Call: Established Patient Appointment needed: Routine Existing   Condition Follow Up    QUESTIONS    Reason for appointment request? No appointments available during search     Additional Information for Provider? Pt said he needs to r/s his appt. Pt   said he wants to get a call if anything opens up. Pt said he would like to   do 19th-20th. Pt also said he is able to do 23-27 as well those days are   wide open for him. Please call him back and let him know if anything opens   up on those days.   ---------------------------------------------------------------------------  --------------  [facility]  [phone]; OK to leave message on voicemail  ---------------------------------------------------------------------------  --------------  SCRIPT ANSWERS

## 2023-10-26 ENCOUNTER — OFFICE VISIT (OUTPATIENT)
Dept: FAMILY MEDICINE CLINIC | Age: 58
End: 2023-10-26

## 2023-10-26 VITALS
TEMPERATURE: 97.3 F | SYSTOLIC BLOOD PRESSURE: 138 MMHG | BODY MASS INDEX: 28.91 KG/M2 | DIASTOLIC BLOOD PRESSURE: 88 MMHG | HEART RATE: 76 BPM | WEIGHT: 195.8 LBS | OXYGEN SATURATION: 96 %

## 2023-10-26 DIAGNOSIS — E78.2 MIXED HYPERLIPIDEMIA: ICD-10-CM

## 2023-10-26 DIAGNOSIS — I10 ESSENTIAL HYPERTENSION: Primary | ICD-10-CM

## 2023-10-26 DIAGNOSIS — Z12.11 COLON CANCER SCREENING: ICD-10-CM

## 2023-10-26 PROCEDURE — 3079F DIAST BP 80-89 MM HG: CPT | Performed by: FAMILY MEDICINE

## 2023-10-26 PROCEDURE — 99214 OFFICE O/P EST MOD 30 MIN: CPT | Performed by: FAMILY MEDICINE

## 2023-10-26 PROCEDURE — 3075F SYST BP GE 130 - 139MM HG: CPT | Performed by: FAMILY MEDICINE

## 2023-10-26 RX ORDER — LISINOPRIL 5 MG/1
5 TABLET ORAL DAILY
Qty: 90 TABLET | Refills: 1 | Status: SHIPPED | OUTPATIENT
Start: 2023-10-26

## 2023-10-26 RX ORDER — ROSUVASTATIN CALCIUM 5 MG/1
5 TABLET, COATED ORAL DAILY
Qty: 90 TABLET | Refills: 1 | Status: SHIPPED | OUTPATIENT
Start: 2023-10-26

## 2023-10-26 ASSESSMENT — PATIENT HEALTH QUESTIONNAIRE - PHQ9
1. LITTLE INTEREST OR PLEASURE IN DOING THINGS: 0
SUM OF ALL RESPONSES TO PHQ QUESTIONS 1-9: 0
2. FEELING DOWN, DEPRESSED OR HOPELESS: 0
SUM OF ALL RESPONSES TO PHQ QUESTIONS 1-9: 0
SUM OF ALL RESPONSES TO PHQ9 QUESTIONS 1 & 2: 0

## 2023-10-27 LAB
ALBUMIN/GLOBULIN RATIO: 1.8 RATIO (ref 0.8–2.6)
ALBUMIN: 4.3 G/DL (ref 3.5–5.2)
ALP BLD-CCNC: 88 U/L (ref 23–144)
ALT SERPL-CCNC: 54 U/L (ref 0–60)
AST SERPL-CCNC: 40 U/L (ref 0–55)
BILIRUB SERPL-MCNC: 0.4 MG/DL (ref 0–1.2)
BUN BLDV-MCNC: 18 MG/DL (ref 3–29)
BUN/CREAT BLD: 15 (ref 7–25)
CALCIUM SERPL-MCNC: 9.4 MG/DL (ref 8.5–10.5)
CHLORIDE BLD-SCNC: 107 MEQ/L (ref 96–110)
CHOLESTEROL: 188 MG/DL
CO2: 22 MEQ/L (ref 19–32)
CREAT SERPL-MCNC: 1.2 MG/DL (ref 0.5–1.4)
GLOBULIN: 2.4 G/DL (ref 1.9–3.6)
GLOMERULAR FILTRATION RATE: 71 MLS/MIN/1.73M2
GLUCOSE BLD-MCNC: 111 MG/DL (ref 70–99)
HDLC SERPL-MCNC: 56 MG/DL
LDL CHOLESTEROL CALCULATED: 114 MG/DL
POTASSIUM SERPL-SCNC: 4.4 MEQ/L (ref 3.4–5.3)
SODIUM BLD-SCNC: 140 MEQ/L (ref 135–148)
STATUS: ABNORMAL
TOTAL PROTEIN: 6.7 G/DL (ref 6–8.3)
TRIGL SERPL-MCNC: 89 MG/DL
VLDLC SERPL CALC-MCNC: 18 MG/DL (ref 4–38)

## 2023-11-27 ENCOUNTER — TELEPHONE (OUTPATIENT)
Dept: FAMILY MEDICINE CLINIC | Age: 58
End: 2023-11-27

## 2023-11-27 NOTE — TELEPHONE ENCOUNTER
Patient called stated he is muscle and joint pain is just as bad or maybe worse since changing to crestor and his cholesterol levels went up. Any other options?   Please advise

## 2023-11-27 NOTE — TELEPHONE ENCOUNTER
Since he has had similar reactions to 2 separate statins, I recommend no more statins and we begin a trial of Zetia which is a different type of medication. This should not cause any of those symptoms.

## 2023-11-28 DIAGNOSIS — E78.2 MIXED HYPERLIPIDEMIA: Primary | ICD-10-CM

## 2023-11-28 DIAGNOSIS — T46.6X5A STATIN MYOPATHY: ICD-10-CM

## 2023-11-28 DIAGNOSIS — G72.0 STATIN MYOPATHY: ICD-10-CM

## 2023-11-28 RX ORDER — EZETIMIBE 10 MG/1
10 TABLET ORAL DAILY
Qty: 90 TABLET | Refills: 1 | Status: SHIPPED | OUTPATIENT
Start: 2023-11-28

## 2024-01-19 DIAGNOSIS — Z12.11 COLON CANCER SCREENING: ICD-10-CM

## 2024-01-19 LAB
CONTROL: NORMAL
FECAL BLOOD IMMUNOCHEMICAL TEST: NEGATIVE

## 2024-01-19 PROCEDURE — 82274 ASSAY TEST FOR BLOOD FECAL: CPT | Performed by: FAMILY MEDICINE

## 2024-04-27 DIAGNOSIS — I10 ESSENTIAL HYPERTENSION: ICD-10-CM

## 2024-04-29 RX ORDER — LISINOPRIL 5 MG/1
5 TABLET ORAL DAILY
Qty: 90 TABLET | Refills: 1 | OUTPATIENT
Start: 2024-04-29

## 2024-04-29 NOTE — TELEPHONE ENCOUNTER
Patient is due for an appointment  Return in about 25 weeks (around 4/18/2024) for HTN, hyperlipidemia.

## 2024-05-08 ENCOUNTER — OFFICE VISIT (OUTPATIENT)
Dept: FAMILY MEDICINE CLINIC | Age: 59
End: 2024-05-08
Payer: COMMERCIAL

## 2024-05-08 VITALS
DIASTOLIC BLOOD PRESSURE: 88 MMHG | SYSTOLIC BLOOD PRESSURE: 126 MMHG | TEMPERATURE: 97.5 F | BODY MASS INDEX: 28.31 KG/M2 | HEART RATE: 84 BPM | OXYGEN SATURATION: 97 % | WEIGHT: 191.7 LBS

## 2024-05-08 DIAGNOSIS — N52.9 ERECTILE DYSFUNCTION, UNSPECIFIED ERECTILE DYSFUNCTION TYPE: ICD-10-CM

## 2024-05-08 DIAGNOSIS — I10 ESSENTIAL HYPERTENSION: Primary | ICD-10-CM

## 2024-05-08 DIAGNOSIS — R13.19 ESOPHAGEAL DYSPHAGIA: ICD-10-CM

## 2024-05-08 DIAGNOSIS — E78.2 MIXED HYPERLIPIDEMIA: ICD-10-CM

## 2024-05-08 DIAGNOSIS — Z78.9 STATIN INTOLERANCE: ICD-10-CM

## 2024-05-08 PROCEDURE — 3074F SYST BP LT 130 MM HG: CPT | Performed by: FAMILY MEDICINE

## 2024-05-08 PROCEDURE — 3079F DIAST BP 80-89 MM HG: CPT | Performed by: FAMILY MEDICINE

## 2024-05-08 PROCEDURE — 99214 OFFICE O/P EST MOD 30 MIN: CPT | Performed by: FAMILY MEDICINE

## 2024-05-08 RX ORDER — EZETIMIBE 10 MG/1
10 TABLET ORAL DAILY
Qty: 90 TABLET | Refills: 1 | Status: SHIPPED | OUTPATIENT
Start: 2024-05-08

## 2024-05-08 RX ORDER — TADALAFIL 20 MG/1
20 TABLET ORAL DAILY PRN
Qty: 30 TABLET | Refills: 1 | Status: SHIPPED | OUTPATIENT
Start: 2024-05-08

## 2024-05-08 RX ORDER — LISINOPRIL 5 MG/1
5 TABLET ORAL DAILY
Qty: 90 TABLET | Refills: 1 | Status: SHIPPED | OUTPATIENT
Start: 2024-05-08

## 2024-05-08 ASSESSMENT — PATIENT HEALTH QUESTIONNAIRE - PHQ9
SUM OF ALL RESPONSES TO PHQ9 QUESTIONS 1 & 2: 0
SUM OF ALL RESPONSES TO PHQ QUESTIONS 1-9: 0
1. LITTLE INTEREST OR PLEASURE IN DOING THINGS: NOT AT ALL
2. FEELING DOWN, DEPRESSED OR HOPELESS: NOT AT ALL

## 2024-05-08 NOTE — PROGRESS NOTES
Subjective:      Gilbert Spring is a 58 y.o. male who presents for evaluation of hypertension and hyperlipidemia. He indicates that he is feeling well and denies any symptoms referable to his elevated blood pressure.   Specifically denies chest pain, palpitations, dyspnea, orthopnea, PND or peripheral edema.  No anorexia, arthralgia, or leg cramps noted. Current medication regimen is as listed below. He denies any side effects of medication, and has been taking it regularly.    Has been having intermittent dysphagia to solids.  Sometimes resolves with drinking liquids, sometime not.    Has ED, currently uses Cialis prn with good results.  Needs refill.    Current Outpatient Medications   Medication Sig Dispense Refill    ezetimibe (ZETIA) 10 MG tablet Take 1 tablet by mouth daily 90 tablet 1    lisinopril (PRINIVIL;ZESTRIL) 5 MG tablet Take 1 tablet by mouth daily 90 tablet 1    tadalafil (CIALIS) 20 MG tablet Take 1 tablet by mouth daily as needed for Erectile Dysfunction 30 tablet 1     No current facility-administered medications for this visit.       No Known Allergies    Social History     Tobacco Use    Smoking status: Never    Smokeless tobacco: Never   Substance Use Topics    Alcohol use: Yes          Objective:      /88 (Site: Left Upper Arm, Position: Sitting, Cuff Size: Medium Adult)   Pulse 84   Temp 97.5 °F (36.4 °C) (Infrared)   Wt 87 kg (191 lb 11.2 oz)   SpO2 97%   BMI 28.31 kg/m²   General: Alert and oriented, in no distress   S1 and S2 normal, no murmurs, clicks, gallops or rubs. Regular rate and rhythm. Chest is clear; no wheezes or rales. No edema or JVD.       Assessment:       Diagnosis Orders   1. Essential hypertension  lisinopril (PRINIVIL;ZESTRIL) 5 MG tablet   Controlled Comprehensive Metabolic Panel      2. Mixed hyperlipidemia  ezetimibe (ZETIA) 10 MG tablet   Asymptomatic Comprehensive Metabolic Panel    Lipid Panel      3. Erectile dysfunction, unspecified erectile

## 2024-05-09 ENCOUNTER — NURSE ONLY (OUTPATIENT)
Dept: FAMILY MEDICINE CLINIC | Age: 59
End: 2024-05-09
Payer: COMMERCIAL

## 2024-05-09 DIAGNOSIS — I10 ESSENTIAL HYPERTENSION: ICD-10-CM

## 2024-05-09 DIAGNOSIS — E78.2 MIXED HYPERLIPIDEMIA: ICD-10-CM

## 2024-05-09 PROCEDURE — 36415 COLL VENOUS BLD VENIPUNCTURE: CPT | Performed by: FAMILY MEDICINE

## 2024-05-10 LAB
ALBUMIN SERPL-MCNC: 4.1 G/DL (ref 3.4–5)
ALBUMIN/GLOB SERPL: 1.6 {RATIO} (ref 1.1–2.2)
ALP SERPL-CCNC: 88 U/L (ref 40–129)
ALT SERPL-CCNC: 32 U/L (ref 10–40)
ANION GAP SERPL CALCULATED.3IONS-SCNC: 13 MMOL/L (ref 3–16)
AST SERPL-CCNC: 30 U/L (ref 15–37)
BUN SERPL-MCNC: 21 MG/DL (ref 7–20)
CALCIUM SERPL-MCNC: 9.1 MG/DL (ref 8.3–10.6)
CHLORIDE SERPL-SCNC: 107 MMOL/L (ref 99–110)
CHOLEST SERPL-MCNC: 157 MG/DL (ref 0–199)
CO2 SERPL-SCNC: 20 MMOL/L (ref 21–32)
CREAT SERPL-MCNC: 1.1 MG/DL (ref 0.9–1.3)
GFR SERPLBLD CREATININE-BSD FMLA CKD-EPI: 78 ML/MIN/{1.73_M2}
GLUCOSE SERPL-MCNC: 107 MG/DL (ref 70–99)
HDLC SERPL-MCNC: 50 MG/DL (ref 40–60)
LDLC SERPL CALC-MCNC: 87 MG/DL
POTASSIUM SERPL-SCNC: 4.6 MMOL/L (ref 3.5–5.1)
PROT SERPL-MCNC: 6.7 G/DL (ref 6.4–8.2)
SODIUM SERPL-SCNC: 140 MMOL/L (ref 136–145)
TRIGL SERPL-MCNC: 98 MG/DL (ref 0–150)
VLDLC SERPL CALC-MCNC: 20 MG/DL

## 2024-05-21 ENCOUNTER — OFFICE (OUTPATIENT)
Dept: URBAN - METROPOLITAN AREA CLINIC 18 | Facility: CLINIC | Age: 59
End: 2024-05-21

## 2024-05-21 VITALS
SYSTOLIC BLOOD PRESSURE: 120 MMHG | WEIGHT: 190 LBS | HEIGHT: 70 IN | OXYGEN SATURATION: 98 % | HEART RATE: 90 BPM | DIASTOLIC BLOOD PRESSURE: 80 MMHG

## 2024-05-21 DIAGNOSIS — R13.19 OTHER DYSPHAGIA: ICD-10-CM

## 2024-05-21 PROCEDURE — 99204 OFFICE O/P NEW MOD 45 MIN: CPT | Performed by: INTERNAL MEDICINE

## 2024-08-20 ENCOUNTER — TELEPHONE (OUTPATIENT)
Dept: FAMILY MEDICINE CLINIC | Age: 59
End: 2024-08-20

## 2024-08-21 DIAGNOSIS — L71.9 ROSACEA: ICD-10-CM

## 2024-08-21 RX ORDER — METRONIDAZOLE 10 MG/G
GEL TOPICAL
Qty: 60 G | Refills: 5 | Status: SHIPPED | OUTPATIENT
Start: 2024-08-21

## 2024-08-21 RX ORDER — METRONIDAZOLE 7.5 MG/G
GEL TOPICAL
Qty: 45 G | Refills: 5 | Status: SHIPPED | OUTPATIENT
Start: 2024-08-21 | End: 2024-08-21

## 2024-08-21 NOTE — TELEPHONE ENCOUNTER
Patient called stated the Metrogel was sent in at 0.75% it should have been the 1% that is what was called in last time.    Please advise

## 2024-11-05 ENCOUNTER — OFFICE VISIT (OUTPATIENT)
Dept: FAMILY MEDICINE CLINIC | Age: 59
End: 2024-11-05
Payer: COMMERCIAL

## 2024-11-05 VITALS
BODY MASS INDEX: 27.23 KG/M2 | OXYGEN SATURATION: 99 % | DIASTOLIC BLOOD PRESSURE: 86 MMHG | HEART RATE: 83 BPM | SYSTOLIC BLOOD PRESSURE: 122 MMHG | TEMPERATURE: 98.2 F | WEIGHT: 184.4 LBS

## 2024-11-05 DIAGNOSIS — Z78.9 STATIN INTOLERANCE: ICD-10-CM

## 2024-11-05 DIAGNOSIS — M54.50 CHRONIC MIDLINE LOW BACK PAIN WITHOUT SCIATICA: ICD-10-CM

## 2024-11-05 DIAGNOSIS — E78.2 MIXED HYPERLIPIDEMIA: ICD-10-CM

## 2024-11-05 DIAGNOSIS — G89.29 CHRONIC MIDLINE LOW BACK PAIN WITHOUT SCIATICA: ICD-10-CM

## 2024-11-05 DIAGNOSIS — N52.9 ERECTILE DYSFUNCTION, UNSPECIFIED ERECTILE DYSFUNCTION TYPE: ICD-10-CM

## 2024-11-05 DIAGNOSIS — I10 ESSENTIAL HYPERTENSION: Primary | ICD-10-CM

## 2024-11-05 PROCEDURE — 3074F SYST BP LT 130 MM HG: CPT | Performed by: FAMILY MEDICINE

## 2024-11-05 PROCEDURE — 99214 OFFICE O/P EST MOD 30 MIN: CPT | Performed by: FAMILY MEDICINE

## 2024-11-05 PROCEDURE — 3079F DIAST BP 80-89 MM HG: CPT | Performed by: FAMILY MEDICINE

## 2024-11-05 RX ORDER — EZETIMIBE 10 MG/1
10 TABLET ORAL DAILY
Qty: 90 TABLET | Refills: 1 | Status: SHIPPED | OUTPATIENT
Start: 2024-11-05

## 2024-11-05 RX ORDER — LISINOPRIL 5 MG/1
5 TABLET ORAL DAILY
Qty: 90 TABLET | Refills: 1 | Status: SHIPPED | OUTPATIENT
Start: 2024-11-05

## 2024-11-05 RX ORDER — TADALAFIL 20 MG/1
20 TABLET ORAL DAILY PRN
Qty: 30 TABLET | Refills: 1 | Status: CANCELLED | OUTPATIENT
Start: 2024-11-05

## 2024-11-05 SDOH — ECONOMIC STABILITY: FOOD INSECURITY: WITHIN THE PAST 12 MONTHS, THE FOOD YOU BOUGHT JUST DIDN'T LAST AND YOU DIDN'T HAVE MONEY TO GET MORE.: NEVER TRUE

## 2024-11-05 SDOH — ECONOMIC STABILITY: FOOD INSECURITY: WITHIN THE PAST 12 MONTHS, YOU WORRIED THAT YOUR FOOD WOULD RUN OUT BEFORE YOU GOT MONEY TO BUY MORE.: NEVER TRUE

## 2024-11-05 SDOH — ECONOMIC STABILITY: INCOME INSECURITY: HOW HARD IS IT FOR YOU TO PAY FOR THE VERY BASICS LIKE FOOD, HOUSING, MEDICAL CARE, AND HEATING?: NOT HARD AT ALL

## 2024-11-05 NOTE — PROGRESS NOTES
Subjective:      Gilbert Spring is a 58 y.o. male who presents for evaluation of hypertension and hyperlipidemia. He indicates that he is feeling well and denies any symptoms referable to his elevated blood pressure.   Specifically denies chest pain, palpitations, dyspnea, orthopnea, PND or peripheral edema.  No anorexia, arthralgia, or leg cramps noted. Current medication regimen is as listed below. He denies any side effects of medication, and has been taking it regularly.    Chronic lower back pain.  Chiropractor ordered MRI or lumbar spine.  L2-3 bulging disc which displaces L2 nerve root.  L3-4 bulging disc causing right neuroforaminal narrowing.  No current pain.  Was placed on prednisone but caused agitation.    Current Outpatient Medications   Medication Sig Dispense Refill    metroNIDAZOLE (METROGEL) 1 % gel Apply topically daily. 60 g 5    ezetimibe (ZETIA) 10 MG tablet Take 1 tablet by mouth daily 90 tablet 1    lisinopril (PRINIVIL;ZESTRIL) 5 MG tablet Take 1 tablet by mouth daily 90 tablet 1    tadalafil (CIALIS) 20 MG tablet Take 1 tablet by mouth daily as needed for Erectile Dysfunction 30 tablet 1     No current facility-administered medications for this visit.       No Known Allergies    Social History     Tobacco Use    Smoking status: Never    Smokeless tobacco: Never   Substance Use Topics    Alcohol use: Yes          Objective:      /86 (Site: Left Upper Arm, Position: Sitting, Cuff Size: Large Adult)   Pulse 83   Temp 98.2 °F (36.8 °C) (Infrared)   Wt 83.6 kg (184 lb 6.4 oz)   SpO2 99%   BMI 27.23 kg/m²   General: Alert and oriented, in no distress   S1 and S2 normal, no murmurs, clicks, gallops or rubs. Regular rate and rhythm. Chest is clear; no wheezes or rales. No edema or JVD.  Back with full range of motion.  No spinal or paraspinal tenderness.  Negative straight leg raise bilaterally.  Normal gait     Assessment:       Diagnosis Orders   1. Essential hypertension

## 2024-11-06 DIAGNOSIS — R74.8 ELEVATED LIVER ENZYMES: Primary | ICD-10-CM

## 2024-11-06 LAB
ALBUMIN SERPL-MCNC: 4.3 G/DL (ref 3.4–5)
ALBUMIN/GLOB SERPL: 1.7 {RATIO} (ref 1.1–2.2)
ALP SERPL-CCNC: 127 U/L (ref 40–129)
ALT SERPL-CCNC: 131 U/L (ref 10–40)
ANION GAP SERPL CALCULATED.3IONS-SCNC: 12 MMOL/L (ref 3–16)
AST SERPL-CCNC: 86 U/L (ref 15–37)
BILIRUB SERPL-MCNC: 0.5 MG/DL (ref 0–1)
BUN SERPL-MCNC: 15 MG/DL (ref 7–20)
CALCIUM SERPL-MCNC: 9.7 MG/DL (ref 8.3–10.6)
CHLORIDE SERPL-SCNC: 106 MMOL/L (ref 99–110)
CO2 SERPL-SCNC: 19 MMOL/L (ref 21–32)
CREAT SERPL-MCNC: 1.2 MG/DL (ref 0.9–1.3)
GFR SERPLBLD CREATININE-BSD FMLA CKD-EPI: 70 ML/MIN/{1.73_M2}
GLUCOSE SERPL-MCNC: 98 MG/DL (ref 70–99)
POTASSIUM SERPL-SCNC: 4.4 MMOL/L (ref 3.5–5.1)
PROT SERPL-MCNC: 6.8 G/DL (ref 6.4–8.2)
SODIUM SERPL-SCNC: 137 MMOL/L (ref 136–145)

## 2024-12-11 ENCOUNTER — LAB (OUTPATIENT)
Dept: FAMILY MEDICINE CLINIC | Age: 59
End: 2024-12-11
Payer: COMMERCIAL

## 2024-12-11 DIAGNOSIS — R74.8 ELEVATED LIVER ENZYMES: ICD-10-CM

## 2024-12-11 PROCEDURE — 36415 COLL VENOUS BLD VENIPUNCTURE: CPT | Performed by: FAMILY MEDICINE

## 2024-12-11 NOTE — PROGRESS NOTES
Patient came in for blood work today. I drew blood from the patients right arm using a straight needles. I drew up one SST tube. Patient tolerated well.

## 2024-12-12 DIAGNOSIS — R74.8 ELEVATED LIVER ENZYMES: Primary | ICD-10-CM

## 2024-12-12 LAB
ALBUMIN SERPL-MCNC: 4.2 G/DL (ref 3.4–5)
ALP SERPL-CCNC: 106 U/L (ref 40–129)
ALT SERPL-CCNC: 64 U/L (ref 10–40)
AST SERPL-CCNC: 46 U/L (ref 15–37)
BILIRUB DIRECT SERPL-MCNC: 0.2 MG/DL (ref 0–0.3)
BILIRUB INDIRECT SERPL-MCNC: 0.2 MG/DL (ref 0–1)
BILIRUB SERPL-MCNC: 0.4 MG/DL (ref 0–1)
PROT SERPL-MCNC: 6.7 G/DL (ref 6.4–8.2)

## 2025-02-06 ENCOUNTER — OFFICE VISIT (OUTPATIENT)
Dept: FAMILY MEDICINE CLINIC | Age: 60
End: 2025-02-06
Payer: COMMERCIAL

## 2025-02-06 VITALS
DIASTOLIC BLOOD PRESSURE: 88 MMHG | HEART RATE: 91 BPM | OXYGEN SATURATION: 96 % | WEIGHT: 188.6 LBS | BODY MASS INDEX: 27.93 KG/M2 | HEIGHT: 69 IN | SYSTOLIC BLOOD PRESSURE: 126 MMHG

## 2025-02-06 DIAGNOSIS — K59.00 CONSTIPATION, UNSPECIFIED CONSTIPATION TYPE: Primary | ICD-10-CM

## 2025-02-06 PROCEDURE — 99213 OFFICE O/P EST LOW 20 MIN: CPT | Performed by: FAMILY MEDICINE

## 2025-02-06 PROCEDURE — 3079F DIAST BP 80-89 MM HG: CPT | Performed by: FAMILY MEDICINE

## 2025-02-06 PROCEDURE — 3074F SYST BP LT 130 MM HG: CPT | Performed by: FAMILY MEDICINE

## 2025-02-06 SDOH — ECONOMIC STABILITY: FOOD INSECURITY: WITHIN THE PAST 12 MONTHS, THE FOOD YOU BOUGHT JUST DIDN'T LAST AND YOU DIDN'T HAVE MONEY TO GET MORE.: NEVER TRUE

## 2025-02-06 SDOH — ECONOMIC STABILITY: FOOD INSECURITY: WITHIN THE PAST 12 MONTHS, YOU WORRIED THAT YOUR FOOD WOULD RUN OUT BEFORE YOU GOT MONEY TO BUY MORE.: NEVER TRUE

## 2025-02-06 ASSESSMENT — PATIENT HEALTH QUESTIONNAIRE - PHQ9
SUM OF ALL RESPONSES TO PHQ QUESTIONS 1-9: 0
1. LITTLE INTEREST OR PLEASURE IN DOING THINGS: NOT AT ALL
SUM OF ALL RESPONSES TO PHQ9 QUESTIONS 1 & 2: 0
2. FEELING DOWN, DEPRESSED OR HOPELESS: NOT AT ALL

## 2025-02-06 NOTE — PROGRESS NOTES
History of Present Illness  The patient presents for evaluation of abdominal pain.    He began experiencing mild abdominal discomfort 2 nights ago, initially attributing it to gas. The discomfort recurred yesterday, escalating to the point where it was painful to ambulate. A bowel movement provided temporary relief, but the discomfort returned last night. He attempted to alleviate the symptoms with a laxative and stool softener, which resulted in a bowel movement around 3:00 AM, providing some relief. However, the discomfort has persisted throughout the day, particularly after eating. He reports that the pain is not as severe as it was initially, but it remains uncomfortable. He has not observed any blood in his stool but notes increased gurgling sounds. Prior to these symptoms, he had no gastrointestinal issues. He has been researching potential causes of his symptoms, including diverticulitis. The severity of the pain disrupted his sleep last night, and he also experienced a severe headache, which he attributes to prolonged bed rest due to his abdominal discomfort.    O:   Vitals:    02/06/25 1441   BP: 126/88   Pulse: 91   SpO2: 96%     No acute distress.  Alert and Oriented x 3  NECK: without thyromegaly, lymphadenopathy, JVD  LUNGS:Clear to ascultation bilaterally.  Breathing comfortably  CARDIOVASCULAR:  Regular rate and rhythm, no murmurs, rubs, or gallops  ABDOMEN: Soft, nontender, nondistended.  No hepatosplenomegaly.  Normal active bowel sounds  SKIN: Warm, Dry, No rash.  PSYCH: Mood and Affect normal.    Assessment & Plan  1. Abdominal pain.  The clinical presentation suggests incomplete bowel movements, leading to gradual accumulation and subsequent constipation. This is a common etiology for such symptoms. The intermittent nature of the pain does not align with a diagnosis of diverticulitis, which typically presents with persistent discomfort due to inflammation. He is advised to take MiraLAX 17 g twice

## 2025-05-07 ENCOUNTER — OFFICE VISIT (OUTPATIENT)
Dept: FAMILY MEDICINE CLINIC | Age: 60
End: 2025-05-07
Payer: COMMERCIAL

## 2025-05-07 VITALS
SYSTOLIC BLOOD PRESSURE: 120 MMHG | TEMPERATURE: 97.1 F | BODY MASS INDEX: 27.14 KG/M2 | HEART RATE: 85 BPM | WEIGHT: 183.8 LBS | OXYGEN SATURATION: 98 % | DIASTOLIC BLOOD PRESSURE: 84 MMHG

## 2025-05-07 DIAGNOSIS — Z12.5 SCREENING PSA (PROSTATE SPECIFIC ANTIGEN): ICD-10-CM

## 2025-05-07 DIAGNOSIS — Z12.11 COLON CANCER SCREENING: ICD-10-CM

## 2025-05-07 DIAGNOSIS — E78.2 MIXED HYPERLIPIDEMIA: ICD-10-CM

## 2025-05-07 DIAGNOSIS — I10 ESSENTIAL HYPERTENSION: Primary | ICD-10-CM

## 2025-05-07 PROCEDURE — 3074F SYST BP LT 130 MM HG: CPT | Performed by: FAMILY MEDICINE

## 2025-05-07 PROCEDURE — 99214 OFFICE O/P EST MOD 30 MIN: CPT | Performed by: FAMILY MEDICINE

## 2025-05-07 PROCEDURE — 3079F DIAST BP 80-89 MM HG: CPT | Performed by: FAMILY MEDICINE

## 2025-05-07 RX ORDER — LISINOPRIL 5 MG/1
5 TABLET ORAL DAILY
Qty: 90 TABLET | Refills: 1 | Status: SHIPPED | OUTPATIENT
Start: 2025-05-07

## 2025-05-07 RX ORDER — METRONIDAZOLE 10 MG/G
GEL TOPICAL
Qty: 60 G | Refills: 5 | Status: SHIPPED | OUTPATIENT
Start: 2025-05-07

## 2025-05-07 RX ORDER — EZETIMIBE 10 MG/1
10 TABLET ORAL DAILY
Qty: 90 TABLET | Refills: 1 | Status: SHIPPED | OUTPATIENT
Start: 2025-05-07

## 2025-05-07 SDOH — ECONOMIC STABILITY: FOOD INSECURITY: WITHIN THE PAST 12 MONTHS, YOU WORRIED THAT YOUR FOOD WOULD RUN OUT BEFORE YOU GOT MONEY TO BUY MORE.: NEVER TRUE

## 2025-05-07 SDOH — ECONOMIC STABILITY: FOOD INSECURITY: WITHIN THE PAST 12 MONTHS, THE FOOD YOU BOUGHT JUST DIDN'T LAST AND YOU DIDN'T HAVE MONEY TO GET MORE.: NEVER TRUE

## 2025-05-07 ASSESSMENT — PATIENT HEALTH QUESTIONNAIRE - PHQ9
SUM OF ALL RESPONSES TO PHQ QUESTIONS 1-9: 0
1. LITTLE INTEREST OR PLEASURE IN DOING THINGS: NOT AT ALL
SUM OF ALL RESPONSES TO PHQ QUESTIONS 1-9: 0
2. FEELING DOWN, DEPRESSED OR HOPELESS: NOT AT ALL
SUM OF ALL RESPONSES TO PHQ QUESTIONS 1-9: 0
SUM OF ALL RESPONSES TO PHQ QUESTIONS 1-9: 0

## 2025-05-07 NOTE — PROGRESS NOTES
History of Present Illness  The patient presents for evaluation of hypertension, hypercholesterolemia, and rosacea.    He reports a general sense of well-being with no adverse reactions to his current medication regimen. He monitors his blood pressure at home, which remains within normal limits. He is not experiencing any chest pain, shortness of breath, lightheadedness, dizziness, or headaches. His daily medication includes lisinopril.    He continues to take Zetia for cholesterol management without any reported issues.    He uses metronidazole gel as needed for rosacea, with the current supply nearing depletion.    He has discontinued the use of Cialis due to lack of necessity.    His last colon cancer screening was conducted in 01/2024. He maintains an active lifestyle, engaging in physical exercise four times a week, alternating between cardiovascular workouts and machine-based exercises. He has ceased using free weights due to joint discomfort.    O:   Vitals:    05/07/25 1033   BP: 120/84   Pulse: 85   Temp: 97.1 °F (36.2 °C)   SpO2: 98%     No acute distress.  Alert and Oriented x 3  NECK: without thyromegaly, lymphadenopathy, JVD  LUNGS:Clear to ascultation bilaterally.  Breathing comfortably  CARDIOVASCULAR:  Regular rate and rhythm, no murmurs, rubs, or gallops  EXTREMITY: Full range of motion. No clubbing/cyanosis/edema  NEURO: Cranial nerves II-XII grossly intact.  Strength 5/5, DTR 2/4.  SKIN: Warm, Dry, No rash.  PSYCH: Mood and Affect normal.  Assessment & Plan  1. Hypertension.  - Blood pressure readings are within the normal range today.  - Currently taking lisinopril daily with no reported issues.  - Advised to continue monitoring blood pressure at home.  - No chest pain, shortness of breath, lightheadedness, dizziness, or headaches reported.    2. Hypercholesterolemia.  - Currently taking Zetia for cholesterol management with no reported issues.  - Advised to continue with the current regimen.  -

## 2025-05-08 ENCOUNTER — RESULTS FOLLOW-UP (OUTPATIENT)
Dept: FAMILY MEDICINE CLINIC | Age: 60
End: 2025-05-08

## 2025-05-08 LAB
ALBUMIN SERPL-MCNC: 4.5 G/DL (ref 3.4–5)
ALBUMIN/GLOB SERPL: 1.7 {RATIO} (ref 1.1–2.2)
ALP SERPL-CCNC: 69 U/L (ref 40–129)
ALT SERPL-CCNC: 43 U/L (ref 10–40)
ANION GAP SERPL CALCULATED.3IONS-SCNC: 11 MMOL/L (ref 3–16)
AST SERPL-CCNC: 33 U/L (ref 15–37)
BILIRUB SERPL-MCNC: 0.5 MG/DL (ref 0–1)
BUN SERPL-MCNC: 20 MG/DL (ref 7–20)
CALCIUM SERPL-MCNC: 9.5 MG/DL (ref 8.3–10.6)
CHLORIDE SERPL-SCNC: 104 MMOL/L (ref 99–110)
CHOLEST SERPL-MCNC: 178 MG/DL (ref 0–199)
CO2 SERPL-SCNC: 24 MMOL/L (ref 21–32)
CREAT SERPL-MCNC: 1.2 MG/DL (ref 0.9–1.3)
GFR SERPLBLD CREATININE-BSD FMLA CKD-EPI: 69 ML/MIN/{1.73_M2}
GLUCOSE SERPL-MCNC: 101 MG/DL (ref 70–99)
HDLC SERPL-MCNC: 48 MG/DL (ref 40–60)
LDLC SERPL CALC-MCNC: 109 MG/DL
POTASSIUM SERPL-SCNC: 4.6 MMOL/L (ref 3.5–5.1)
PROT SERPL-MCNC: 7.1 G/DL (ref 6.4–8.2)
PSA SERPL DL<=0.01 NG/ML-MCNC: 1.06 NG/ML (ref 0–4)
SODIUM SERPL-SCNC: 139 MMOL/L (ref 136–145)
TRIGL SERPL-MCNC: 104 MG/DL (ref 0–150)
VLDLC SERPL CALC-MCNC: 21 MG/DL

## 2025-06-13 ENCOUNTER — COMMUNITY OUTREACH (OUTPATIENT)
Dept: FAMILY MEDICINE CLINIC | Age: 60
End: 2025-06-13

## 2025-06-13 NOTE — PROGRESS NOTES
Patient's HM shows they are overdue for Colorectal Screening.   Care Everywhere and  files searched.  No results to attach to order nor HM updated.     Cologuard ordered 5/7/25, no current results found.

## 2025-06-20 ENCOUNTER — TELEPHONE (OUTPATIENT)
Dept: FAMILY MEDICINE CLINIC | Age: 60
End: 2025-06-20

## 2025-06-20 NOTE — TELEPHONE ENCOUNTER
Patient called office stating his back pain has flared up again. Patient states you are aware of this problem and is requesting you prescribe him lidocaine patch (LIDODERM) 5 % topical patch.

## 2025-06-23 NOTE — TELEPHONE ENCOUNTER
Insurance usually does not pay for the Lidoderm 5% patch.  There is an over-the-counter 4% patch which works just as well.

## 2025-06-24 ENCOUNTER — OFFICE VISIT (OUTPATIENT)
Dept: FAMILY MEDICINE CLINIC | Age: 60
End: 2025-06-24

## 2025-06-24 VITALS
OXYGEN SATURATION: 98 % | HEART RATE: 76 BPM | TEMPERATURE: 98.6 F | WEIGHT: 189.8 LBS | BODY MASS INDEX: 28.03 KG/M2 | DIASTOLIC BLOOD PRESSURE: 88 MMHG | SYSTOLIC BLOOD PRESSURE: 128 MMHG

## 2025-06-24 DIAGNOSIS — S76.212A INGUINAL STRAIN, LEFT, INITIAL ENCOUNTER: ICD-10-CM

## 2025-06-24 DIAGNOSIS — L82.1 SEBORRHEIC KERATOSIS: ICD-10-CM

## 2025-06-24 DIAGNOSIS — M54.50 CHRONIC MIDLINE LOW BACK PAIN WITHOUT SCIATICA: Primary | ICD-10-CM

## 2025-06-24 DIAGNOSIS — G89.29 CHRONIC MIDLINE LOW BACK PAIN WITHOUT SCIATICA: Primary | ICD-10-CM

## 2025-06-24 RX ORDER — LIDOCAINE 50 MG/G
1 PATCH TOPICAL DAILY
Qty: 30 PATCH | Refills: 0 | Status: SHIPPED | OUTPATIENT
Start: 2025-06-24

## 2025-06-24 NOTE — PROGRESS NOTES
History of Present Illness  The patient presents for evaluation of back pain, groin pull, and seborrheic keratosis.    He reports a recurrence of back pain, which he attributes to a herniated disc. The pain is described as sharp and intermittent, localized to the left side of his lower back and buttock, without any radiation down the leg. He recalls a similar episode of pain following a 6-mile hike approximately 2 weeks ago. The pain has been fluctuating since then, with periods of relief followed by exacerbation. He rates his current pain level as more of an irritation than severe pain. He reports no loss of bowel or bladder control, and no weakness or numbness in his leg. He has found relief from lidocaine patches, which were previously prescribed during an emergency room visit. Currently, he is using IcyHot with lidocaine 4 percent.    Additionally, he mentions a recent groin strain, which he believes is due to overexertion on the bike. The pain extends from the groin up through his abdomen. He has been avoiding biking and notes some improvement in his symptoms today.    He also reports a new skin lesion on his back, which was first noticed during a swimming session on 06/21/2025.    O:   Vitals:    06/24/25 1335   BP: 128/88   Pulse:    Temp:    SpO2:      No acute distress.  Alert and Oriented x 3  HEENT:  PERRLA, EOMI, Conjunctiva clear  NECK: without thyromegaly, lymphadenopathy, JVD  LUNGS:Clear to ascultation bilaterally.  Breathing comfortably  CARDIOVASCULAR:  Regular rate and rhythm, no murmurs, rubs, or gallops  Back without spinal tenderness.  Mild tenderness over left SI joint.  Negative straight leg raise bilaterally  EXTREMITY: Full range of motion. No clubbing/cyanosis/edema  NEURO: Cranial nerves II-XII grossly intact.  Strength 5/5, DTR 2/4.  SKIN: Warm, Dry, No rash.  Right middle back with 0.75 cm scaly round well-circumscribed lesion.  PSYCH: Mood and Affect normal.    Assessment & Plan  1.